# Patient Record
Sex: FEMALE | Race: WHITE | NOT HISPANIC OR LATINO | Employment: FULL TIME | URBAN - METROPOLITAN AREA
[De-identification: names, ages, dates, MRNs, and addresses within clinical notes are randomized per-mention and may not be internally consistent; named-entity substitution may affect disease eponyms.]

---

## 2023-03-08 ENCOUNTER — APPOINTMENT (OUTPATIENT)
Dept: PHARMACY | Facility: HOSPITAL | Age: 58
End: 2023-03-08
Payer: COMMERCIAL

## 2023-03-08 DIAGNOSIS — E66.01 MORBID OBESITY WITH BMI OF 45.0-49.9, ADULT (MULTI): Primary | ICD-10-CM

## 2023-03-08 PROBLEM — R79.0 LOW IRON STORES: Status: ACTIVE | Noted: 2023-03-08

## 2023-03-08 PROBLEM — T78.40XA ALLERGIC REACTION: Status: ACTIVE | Noted: 2023-03-08

## 2023-03-08 PROBLEM — I10 BENIGN ESSENTIAL HYPERTENSION: Status: ACTIVE | Noted: 2023-03-08

## 2023-03-08 PROBLEM — E78.5 HYPERLIPIDEMIA: Status: ACTIVE | Noted: 2023-03-08

## 2023-03-08 PROBLEM — D64.9 ANEMIA: Status: ACTIVE | Noted: 2023-03-08

## 2023-03-08 PROBLEM — R00.2 PALPITATION: Status: ACTIVE | Noted: 2023-03-08

## 2023-03-08 PROBLEM — M54.2 NECK PAIN: Status: ACTIVE | Noted: 2023-03-08

## 2023-03-08 PROBLEM — R21 RASH: Status: ACTIVE | Noted: 2023-03-08

## 2023-03-08 PROBLEM — R60.0 LEG EDEMA: Status: ACTIVE | Noted: 2023-03-08

## 2023-03-08 PROBLEM — R10.2 PELVIC PAIN: Status: ACTIVE | Noted: 2023-03-08

## 2023-03-08 PROBLEM — M25.562 LEFT KNEE PAIN: Status: ACTIVE | Noted: 2023-03-08

## 2023-03-08 PROBLEM — M17.10 ARTHRITIS OF KNEE: Status: ACTIVE | Noted: 2023-03-08

## 2023-03-08 RX ORDER — SEMAGLUTIDE 0.5 MG/.5ML
0.5 INJECTION, SOLUTION SUBCUTANEOUS
COMMUNITY
Start: 2023-02-17 | End: 2023-03-15 | Stop reason: SDUPTHER

## 2023-03-08 RX ORDER — FERROUS GLUCONATE 324(38)MG
38 TABLET ORAL 2 TIMES DAILY
COMMUNITY
Start: 2019-02-24

## 2023-03-08 RX ORDER — SIMVASTATIN 40 MG/1
40 TABLET, FILM COATED ORAL DAILY
COMMUNITY
Start: 2019-02-24 | End: 2023-07-14 | Stop reason: SDUPTHER

## 2023-03-08 RX ORDER — LISINOPRIL 20 MG/1
20 TABLET ORAL DAILY
COMMUNITY
Start: 2020-07-24 | End: 2023-07-14 | Stop reason: SDUPTHER

## 2023-03-08 RX ORDER — METOPROLOL SUCCINATE 50 MG/1
50 TABLET, EXTENDED RELEASE ORAL DAILY
COMMUNITY
Start: 2020-07-24 | End: 2023-07-14 | Stop reason: SDUPTHER

## 2023-03-08 NOTE — PROGRESS NOTES
[facility]  Mirtha Faria was referred to the Clinical Pharmacy Team for Weight loss management and the addition of Wegovy     Referring Provider: Yolie Serra MD  [address]  [facility], [address]  [place] [address]   _______________________________________________________________________  MEDICATION INITIATION ASSESSMENT    ALLERGIES:     - PMH of Pancreatitis: {YES,NO:42430}  - PMH of Retinopathy: {YES,NO:13792}  - PMH of Urinary Tract Infections: {YES,NO:51532}  Home Pharmacy Reviewed? yes, [facility] [address]  - Last PCP Visit: 12/21/22  - PMH of Pancreatitis: no  - PMH of Retinopathy: no    Diet:   Trying to incorporate more protein and veggies   Been eating breakfast   - 3 meals per day, occasional snack -cereal before bed  - Breakfast: protein pancake, turkey mattson,eggs banana   - Lunch: Leftovers from dinner the day before  - Dinner: protein + veggies, taco soup with ground turkey,   - Snacks: prunes, Special K cereal before bed on occasion, doesn’t tend to eat many snacks   - Drinks: Drinks coffee in office 2xs/ week with splenda and creamer, orange juice, almond milk, diet Pepsi , occasionally, trying to drink more water    Weight: 12/21/22 was 342lb     Exercise Routine:   -Arthritis in knee, goes to physical therapy  -Pool in summer  -Wants to walk more, recently joined [employer]  -[employer] job, project   Adverse Effects: denies any side effects     CURRENT PHARMACOTHERAPY  - Wegovy 0.5mg once weekly under the skin   RELEVANT LAB RESULTS    No results found for: HGBA1C  Lab Results   Component Value Date    CREATININE 0.94 12/29/2022        _______________________________________________________________________  PATIENT EDUCATION/DISCUSSION:  - Counseled patient on MOA, expectations, side effects, duration of therapy, contraindications, administration, and monitoring parameters  - Answered all patient questions and  concerns  _______________________________________________________________________  PLAN  1. START [Insert med and dosing strategy]  2. Prescription sent to [facility] pharmacy for assistance on authorization and copay. Medication will be mailed to patient.    [facility] Provider Follow-Up: [Date]  Clinical Pharmacist follow-up: [Date]  PCP Follow-Up: [Date]    Sloane Mckeon, Mary Beth    Verbal consent to manage patient's drug therapy was obtained from the patient. They were informed they may decline to participate or withdraw from participation in pharmacy services at any time.

## 2023-03-15 ENCOUNTER — TELEMEDICINE (OUTPATIENT)
Dept: PHARMACY | Facility: HOSPITAL | Age: 58
End: 2023-03-15
Payer: COMMERCIAL

## 2023-03-15 DIAGNOSIS — E66.01 MORBID OBESITY WITH BMI OF 45.0-49.9, ADULT (MULTI): ICD-10-CM

## 2023-03-15 RX ORDER — SEMAGLUTIDE 1 MG/.5ML
1 INJECTION, SOLUTION SUBCUTANEOUS
Qty: 2 ML | Refills: 0 | Status: SHIPPED | OUTPATIENT
Start: 2023-03-15 | End: 2023-04-12 | Stop reason: ALTCHOICE

## 2023-03-15 NOTE — PROGRESS NOTES
Pharmacist Clinic: Weight Loss   Follow up  3/15/23     Mirtha Faria (Debbie) was referred to the Clinical Pharmacy Team for weight loss management.    Referring Provider: Dr. Yolie Serra    HISTORY OF PRESENT ILLNESS  Mirtha Faria (Debbie) is a 57 year old with a past medical history of class 3 obesity evidenced by BMI of 48.38kg/m2. She is currently on wegovy 0.5mg once weekly for weight loss management. Referred to pharmacy clinic for medication optimization by Dr. Serra.  _______________________________________________________________________  MEDICATION INITIATION ASSESSMENT    ALLERGIES: Reviewed   Pharmacy: Reviewed     - PMH of Pancreatitis: No    - 3 meals per day, occasional snack -cereal before bed  - Breakfast: protein pancake, turkey mattson,eggs banana   - Lunch: Leftovers from dinner the day before  - Dinner: protein + veggies, taco soup with ground turkey,   - Snacks: prunes, Special K cereal before bed on occasion, doesn’t tend to eat many snacks   - Drinks: Drinks coffee in office 2xs/ week with splenda and creamer, orange juice, almond milk, diet Pepsi , occasionally, trying to drink more water    Weight: 12/21/22 was 342lb   Exercise:   - Does physical therapy 2 days a week   - Started going to the gym 3 other days of the week       CURRENT PHARMACOTHERAPY  - Wegovy 0.5mg under the skin once weekly     Adverse Effects: mentioned some nausea but said it is very rare and not bothersome.     No issues reported in regards to accessibility, affordability, adherence, adverse effects, or organization    RELEVANT LAB RESULTS  No results found for: HGBA1C  Lab Results   Component Value Date    CREATININE 0.94 12/29/2022     Lab Results   Component Value Date    ALT 19 12/29/2022    AST 15 12/29/2022    ALKPHOS 78 12/29/2022    BILITOT 0.5 12/29/2022       DRUG INTERACTIONS  - No significant drug-drug interactions exist that require adjustment to  therapy.  _______________________________________________________________________  PATIENT EDUCATION/DISCUSSION:  - Discussed continuing the good work on diet and exercise   - discussed trying to get a scale to weigh herself every once in a while to see the effects of wegovy   - Answered all patient questions and concerns  _______________________________________________________________________  PLAN  Mirtha (Sri) Giana is a 57 year old with a past medical history of class 3 obesity evidenced by BMI of 48.38kg/m2. She is currently on wegovy 0.5mg once weekly for weight loss management.  We will increase to 1mg once weekly because she is tolerating the Wegovy with no major side effects.   START:   Wegovy  1 mg under the skin once weekly on sundays   1. Medications are dosed appropriate for her renal and hepatic function   Clinical Pharmacist follow-up: [Date]      Sloane Mckeon, PharmD  [facility] PGY-1 Pharmacy Resident     Verbal consent to manage patient's drug therapy was obtained from the patient. They were informed they may decline to participate or withdraw from participation in pharmacy services at any time.

## 2023-03-15 NOTE — PROGRESS NOTES
I reviewed the resident’s note.  I agree with the resident’s findings and plan.     Raj Castro, PharmD

## 2023-04-11 PROBLEM — R63.4 WEIGHT LOSS: Status: ACTIVE | Noted: 2023-04-11

## 2023-04-11 NOTE — PROGRESS NOTES
Patient is sent at the request of Yolie Serra MD for my opinion regarding weight loss.  My final recommendations will be communicated back to the requesting provider by way of shared medical record.     Chapis Faria (Debbie) is a 57 year old with a past medical history of class 3 obesity evidenced by BMI of 48.38kg/m2. At last PharmD. Visit, Sloane Mckeon increased patient's Wegovy dose to 1mg once weekly.     The patient does have a known family history of diabetes.    Allergies   Allergen Reactions    Hydrochlorothiazide Rash     Diet:  - 3 meals per day, occasional snack -cereal before bed  -Patient is focusing on eating more protein and drinking more water  - Breakfast: protein pancake, turkey mattson, OR eggs, sometimes banana or fruit  - Lunch: Leftovers from dinner the day before  - Dinner: protein + veggies, taco soup with ground turkey  - Snacks: prunes, Special K cereal before bed on occasion, doesn’t tend to eat many snacks   - Drinks: Drinks coffee in office 2xs/ week with splenda and creamer, orange juice, almond milk, diet Pepsi , occasionally, trying to drink more water   -Denies referral to dietician at this time.     Exercise:  Weight: 12/21/22 was 342lb   4/12/23 ~337lb last she remembers    Exercise:   -Does physical therapy 2 days a week   -Started going to the gym 3 other days of the week   -Taking her dog for a walk    Sleep:  -Averages 7-9 hours of sleep per night  -Was less when her mother passed away    Stress Level:  -Rates 9/10 on recent stress in daily life  -Mom recently passed away    Objective     There were no vitals taken for this visit.    Current Pharmacotherapy:    -Wegovy 1mg: inject 1mg once weekly on Sundays    Drug-drug interactions:  No significant drug interactions that require medication adjustment at this time.     Lab Review  Lab Results   Component Value Date    BILITOT 0.5 12/29/2022    CALCIUM 9.4 12/29/2022    CO2 26 12/29/2022     12/29/2022     CREATININE 0.94 12/29/2022    GLUCOSE 88 12/29/2022    ALKPHOS 78 12/29/2022    K 4.6 12/29/2022    PROT 6.9 12/29/2022     12/29/2022    AST 15 12/29/2022    ALT 19 12/29/2022    BUN 20 12/29/2022    ANIONGAP 14 12/29/2022    ALBUMIN 4.2 12/29/2022    GFRF 71 12/29/2022     Lab Results   Component Value Date    TRIG 182 (H) 12/29/2022    CHOL 228 (H) 12/29/2022    HDL 54.5 12/29/2022     LDL 0 - 99 mg/dL 137 High  112 High      - PMH of Pancreatitis: No     Assessment/Plan   Problem List Items Addressed This Visit          Endocrine/Metabolic    Morbid obesity with BMI of 45.0-49.9, adult (CMS/[facility]) - Primary    Relevant Medications    semaglutide, weight loss, (Wegovy) 1.7 mg/0.75 mL pen injector    Other Relevant Orders    Follow Up In Advanced Primary Care - Pharmacy     RX changes:   INCREASE:  -Wegovy 1.7mg: inject 1.7mg once weekly on Sundays  Patient is tolerating Wegovy 1mg dose extremely well with side effects, no nausea or GI issues. Counseled on increased dose in adjunct to lifestyle changes. Patient will weigh herself once weekly and continue working on lifestyle changes such as walking the dog outside and eating more protein, fruits and vegetables. Sent Wegov[facility]mg [facility] to [facility] in [place].    Patient Education/ Goals:  Weight management, chronic (Wegovy):  -Instructed the patient that Wegovy must be refrigerated If necessary, the pen may be kept at room temperature for up to 28 days.   -Check the pen label to make sure that it is the correct medication and dose. Also check to make sure that the solution is not cloudy, discolored or have particles in it.   -Ensure to change (rotate) injection sites each week. You can use the same area of the body but inject in a different part of that area.   -Injections should be done on the same day each week, if you forget you have up to 5 days to  take your dose.   - Advised patient that they may experience improved satiety after meals and portion  sizes of meals may be reduced as doses of Trulicity increase.  -Week 13 through week 16: 1.7 mg once weekly  -Week 17 and thereafter (maintenance dosage): 2.4 mg once weekly; if not tolerated, may temporarily decrease dosage to 1.7 mg once weekly for up to 4 additional weeks, then increase to 2.4 mg once weekly.    Compliance at present is estimated to be excellent. Efforts to improve compliance (if necessary) will be directed at dietary modifications: more protein and fruits, vegetables, limiting snacking and increased exercise.    Follow up: I recommend diabetes care be  3 weeks, 8:30am May 3rd, 2023    Future recommendations:   -Continue to work on lifestyle interventions such as exercising more, at least 30 minutes per day, 5 days of the week. Continue trying to implement healthy lifestyle changes such as eating less processed food. Focus on eating more fruits and vegetables, incorporate whole foods into daily diet.     Apolonia Gamez, PharmD    Meds PGY1 Resident    Verbal consent to manage patient's drug therapy was obtained from the patient. They were informed they may decline to participate or withdraw from participation in pharmacy services at any time.

## 2023-04-12 ENCOUNTER — TELEMEDICINE (OUTPATIENT)
Dept: PHARMACY | Facility: HOSPITAL | Age: 58
End: 2023-04-12
Payer: COMMERCIAL

## 2023-04-12 DIAGNOSIS — E66.01 MORBID OBESITY WITH BMI OF 45.0-49.9, ADULT (MULTI): Primary | ICD-10-CM

## 2023-04-12 RX ORDER — SEMAGLUTIDE 1.7 MG/.75ML
1.7 INJECTION, SOLUTION SUBCUTANEOUS
Qty: 3 ML | Refills: 1 | Status: SHIPPED | OUTPATIENT
Start: 2023-04-12 | End: 2023-05-03 | Stop reason: ALTCHOICE

## 2023-04-12 NOTE — PROGRESS NOTES
I reviewed the progress note and agree with the resident’s findings and plans as written. Case discussed with resident.    Raj Castro, CarlosD

## 2023-04-12 NOTE — PATIENT INSTRUCTIONS
"Keep up the great work with lifestyle changes! Let walking and exercise be your stress relief and replace \"bored/ stressful\" eating with healthier habits. Continue eating fruits, vegetables, centering meals around protein, nutrient dense foods. I sent Wegovy 1.7 mg dose to [facility] [place].    Thanks! Have a great week.    Apolonia Gamez, PharmD    Meds PGY1 Resident  "

## 2023-04-12 NOTE — ASSESSMENT & PLAN NOTE
Patient is tolerating Wegovy 1mg dose extremely well with side effects, no nausea or GI issues. Counseled on increased dose in adjunct to lifestyle changes. Patient will weigh herself once weekly and continue working on lifestyle changes such as walking the dog outside and eating more protein, fruits and vegetables. Sent Wegovy 1.7mg prescription to [facility] in [place].

## 2023-05-01 NOTE — PROGRESS NOTES
"Patient is sent at the request of Yolie Serra MD for my opinion regarding weight loss.  My final recommendations will be communicated back to the requesting provider by way of shared medical record.     Chapis Faria (Debbie) is a 57 year old with a past medical history of class 3 obesity evidenced by BMI of 48.38kg/m2. At last PharmD. Visit, increased patient's Wegovy dose to 1.7mg once weekly.     The patient does have a known family history of diabetes.    Allergies   Allergen Reactions    Hydrochlorothiazide Rash     Diet: \"no major changes per diet\" patient is really focusing on eating nutrient-dense foods  - ~3 smaller meals per day, occasional snack   -Patient is focusing on eating more protein and drinking more water  - Breakfast: protein pancake, turkey mattson, OR eggs, sometimes banana or fruit  - Lunch: Leftovers from dinner the day before  - Dinner: protein + veggies, taco soup with ground turkey  - Snacks: prunes, Special K cereal   - Drinks: Drinks coffee in office 2xs/ week with splenda and creamer, orange juice, almond milk, diet Pepsi occasionally,  drinking more water   -Denies referral to dietician at this time.     Exercise:  Weight: 12/21/22 was 342lb   4/12/23 ~337lb last she remembers  5/1/23: Has not been weighing but notices that her clothes are bigger, feels the weight loss    Exercise:   -Does physical therapy 2 days a week   -Started going to the gym 3 other days of the week   -Taking her dog for a walk  -Goes to the gym with her son, others are motivating her    Sleep:  -Averages 7-9 hours of sleep per night  -Was less when her mother passed away    Stress Level:  -Rates 5-6/10 on recent stress in daily life  -Mom recently passed away (Was 9/10 previously)    Objective     There were no vitals taken for this visit.    Current Pharmacotherapy:    -Wegovy 1.7mg: inject 1.7mg once weekly on Sundays    Drug-drug interactions:  No significant drug interactions that require " medication adjustment at this time.     Lab Review  Lab Results   Component Value Date    BILITOT 0.5 12/29/2022    CALCIUM 9.4 12/29/2022    CO2 26 12/29/2022     12/29/2022    CREATININE 0.94 12/29/2022    GLUCOSE 88 12/29/2022    ALKPHOS 78 12/29/2022    K 4.6 12/29/2022    PROT 6.9 12/29/2022     12/29/2022    AST 15 12/29/2022    ALT 19 12/29/2022    BUN 20 12/29/2022    ANIONGAP 14 12/29/2022    ALBUMIN 4.2 12/29/2022    GFRF 71 12/29/2022     Lab Results   Component Value Date    TRIG 182 (H) 12/29/2022    CHOL 228 (H) 12/29/2022    HDL 54.5 12/29/2022     LDL 0 - 99 mg/dL 137 High  112 High      - PMH of Pancreatitis: No   - PMH of Retinopathy: No   - PMH of Thyroid C-Cell Tumors: No    Assessment/Plan   Problem List Items Addressed This Visit          Endocrine/Metabolic    Morbid obesity with BMI of 45.0-49.9, adult (CMS/[facility]) - Primary     RX changes:   INCREASE:  -Wegovy 2.4 mg: inject 2.4 mg once weekly on Sundays  Patient is tolerating Wegovy 1.7mg dose extremely well with no major side effects, reports minor bloating after dose increase and feels much anders after meals. No GI issues. Counseled patient on increased dose in adjunct to lifestyle changes. Patient will weigh herself once weekly and continue working on lifestyle changes such as walking the dog outside and eating more protein and vegetables. Sent Wegovy 2.4 mg prescription to [facility] in [place].    Patient Education/ Goals:  Weight management, chronic (Wegovy):  - Advised patient that they may experience improved satiety after meals and portion sizes of meals may be reduced as doses of Trulicity increase.  -Week 17 and thereafter (maintenance dosage): 2.4 mg once weekly; if not tolerated, may temporarily decrease dosage to 1.7 mg once weekly for up to 4 additional weeks, then increase to 2.4 mg once weekly.    Compliance at present is estimated to be excellent. Efforts to improve compliance (if necessary) will be directed at  dietary modifications: more protein and vegetables, limiting snacking and increased exercise.    Follow up: I recommend diabetes care be:  4 weeks at 8:30AM, 5/31/23    Future recommendations:   -Continue to work on lifestyle interventions such as exercising more, at least 30 minutes per day, 5 days of the week. Continue trying to implement healthy lifestyle changes such as eating less processed food. Focus on eating more fruits and vegetables, incorporate whole foods into daily diet.   -Recommended updated lipid panel and A1c    Apolonia Gamez, PharmD    Meds PGY1 Resident    Verbal consent to manage patient's drug therapy was obtained from the patient. They were informed they may decline to participate or withdraw from participation in pharmacy services at any time.

## 2023-05-03 ENCOUNTER — TELEMEDICINE (OUTPATIENT)
Dept: PHARMACY | Facility: HOSPITAL | Age: 58
End: 2023-05-03
Payer: COMMERCIAL

## 2023-05-03 ENCOUNTER — TELEPHONE (OUTPATIENT)
Dept: PRIMARY CARE | Facility: CLINIC | Age: 58
End: 2023-05-03

## 2023-05-03 ENCOUNTER — TELEPHONE (OUTPATIENT)
Dept: PHARMACY | Facility: HOSPITAL | Age: 58
End: 2023-05-03

## 2023-05-03 DIAGNOSIS — E66.01 MORBID OBESITY WITH BMI OF 45.0-49.9, ADULT (MULTI): Primary | ICD-10-CM

## 2023-05-03 RX ORDER — SEMAGLUTIDE 2.4 MG/.75ML
2.4 INJECTION, SOLUTION SUBCUTANEOUS
Qty: 3 ML | Refills: 3 | Status: SHIPPED | OUTPATIENT
Start: 2023-05-03 | End: 2023-07-26 | Stop reason: ALTCHOICE

## 2023-05-03 NOTE — Clinical Note
Plan to increase to Wegovy 2.7mg dose for maximum weight loss potential, patient is tolerating well. Sent [facility] [place] [place].

## 2023-05-03 NOTE — TELEPHONE ENCOUNTER
David Barksdale,   I rec'd a PA request for patients Wegovy, can you do this one please, thank you  Jessie

## 2023-05-26 DIAGNOSIS — R79.0 LOW IRON STORES: ICD-10-CM

## 2023-05-26 DIAGNOSIS — I10 BENIGN ESSENTIAL HYPERTENSION: ICD-10-CM

## 2023-05-26 DIAGNOSIS — D64.9 ANEMIA, UNSPECIFIED TYPE: ICD-10-CM

## 2023-05-26 DIAGNOSIS — E78.5 HYPERLIPIDEMIA, UNSPECIFIED HYPERLIPIDEMIA TYPE: ICD-10-CM

## 2023-05-31 ENCOUNTER — APPOINTMENT (OUTPATIENT)
Dept: PHARMACY | Facility: HOSPITAL | Age: 58
End: 2023-05-31
Payer: COMMERCIAL

## 2023-06-01 ENCOUNTER — TELEMEDICINE (OUTPATIENT)
Dept: PHARMACY | Facility: HOSPITAL | Age: 58
End: 2023-06-01
Payer: COMMERCIAL

## 2023-06-01 DIAGNOSIS — E78.2 MIXED HYPERLIPIDEMIA: Primary | ICD-10-CM

## 2023-06-01 DIAGNOSIS — E66.01 MORBID OBESITY WITH BMI OF 45.0-49.9, ADULT (MULTI): ICD-10-CM

## 2023-06-01 NOTE — PROGRESS NOTES
Patient is sent at the request of Yolie Serra MD for my opinion regarding weight loss.  My final recommendations will be communicated back to the requesting provider by way of shared medical record.     Chapis Faria (Debbie) is a 57 year old with a past medical history of class 3 obesity evidenced by BMI of 48.38kg/m2. At last PharmD. Visit, increased patient's Wegovy dose to 2.4mg once weekly.     The patient does have a known family history of diabetes.    Allergies   Allergen Reactions    Hydrochlorothiazide Rash     Diet:   Eating much less   Added more protein to her diet   Has protein shakes with no sugar sometimes   ~3 smaller meals per day, occasional snack   -Patient is focusing on eating more protein and drinking more water  - Breakfast: protein pancake, turkey mattson, OR eggs, sometimes banana or fruit  - Lunch: Leftovers from dinner the day before  - Dinner: protein + veggies, taco soup with ground turkey  - Snacks: prunes, Special K cereal   - Drinks: Drinks coffee in office 2xs/ week with splenda and creamer, orange juice rarely ,sugar free iced tea, diet Pepsi occasionally,  drinking more water      Weight:   12/21/22 was 342lb   4/12/23 ~337lb last she remembers  5/1/23: Has not been weighing but notices that her clothes are bigger, feels the weight loss  6/1/23- notices clothes are fitting better, 332 lbs     Exercise:   -Started going to the gym 2-4  days of the week  at the gym   -Taking her dog for a walk sometimes   -Goes to the gym with her son, others are motivating her      Objective     There were no vitals taken for this visit.    Current Pharmacotherapy:    -Wegovy 2.4mg: inject 2.4 mg once weekly on Sundays    Side Effects: None     Drug-drug interactions:  No significant drug interactions that require medication adjustment at this time.     Lab Review  Lab Results   Component Value Date    BILITOT 0.5 12/29/2022    CALCIUM 9.4 12/29/2022    CO2 26 12/29/2022      12/29/2022    CREATININE 0.94 12/29/2022    GLUCOSE 88 12/29/2022    ALKPHOS 78 12/29/2022    K 4.6 12/29/2022    PROT 6.9 12/29/2022     12/29/2022    AST 15 12/29/2022    ALT 19 12/29/2022    BUN 20 12/29/2022    ANIONGAP 14 12/29/2022    ALBUMIN 4.2 12/29/2022    GFRF 71 12/29/2022     Lab Results   Component Value Date    TRIG 182 (H) 12/29/2022    CHOL 228 (H) 12/29/2022    HDL 54.5 12/29/2022     LDL 0 - 99 mg/dL 137 High  112 High      - PMH of Pancreatitis: No   - PMH of Retinopathy: No   - PMH of Thyroid C-Cell Tumors: No    Assessment/Plan   Problem List Items Addressed This Visit          Endocrine/Metabolic    Morbid obesity with BMI of 45.0-49.9, adult (CMS/[facility])    Relevant Orders    Follow Up In Advanced Primary Care - Pharmacy     RX changes:    Continue:   -Wegovy 2.4 mg: inject 2.4 mg once weekly on Sundays      All medications are dosed appropriate for renal and hepatic function   Patient Education/ Goals:  Weight management, chronic (Wegovy):  - Advised patient that they may experience improved satiety after meals and portion sizes of meals may be reduced as doses of Trulicity increase.  - discussed that she may not be losing as much weight on the scale because muscles weighs more than fat.   - discussed that she may see some changes in her weight especially if she is weighing herself at different times a day.   -Week 17 and thereafter (maintenance dosage): 2.4 mg once weekly    Compliance at present is estimated to be excellent. Efforts to improve compliance (if necessary) will be directed at increased exercise.   - discussed the goal of 30 minutes of physical activity 5 days a week. She is currently doing 3-4 so trying to add a few more days can be very beneficial for her.     Follow up: I recommend diabetes care be:  4 weeks at 6/28/23 at 8:30am     Future recommendations:   -Recommended updated lipid panel and A1c    Continue all meds under the continuation of care with the referring  provider and clinical pharmacy team.    Sloane Mckeon PharmD   Meds PGY1 Pharmacy Resident     Verbal consent to manage patient’s drug therapy was obtained from the patient. They were informed they may decline to participate or withdraw from participation in pharmacy services at any time.

## 2023-06-02 ENCOUNTER — APPOINTMENT (OUTPATIENT)
Dept: PRIMARY CARE | Facility: CLINIC | Age: 58
End: 2023-06-02
Payer: COMMERCIAL

## 2023-06-27 NOTE — PROGRESS NOTES
Patient is sent at the request of Yolie Serra MD for my opinion regarding weight loss.  My final recommendations will be communicated back to the requesting provider by way of shared medical record.     Chapis Faria (Debbie) is a 57 year old with a past medical history of class 3 obesity evidenced by BMI of 48.38kg/m2.  She is currently on Wegovy 2.4 mg once a week.    The patient does have a known family history of diabetes.    Allergies   Allergen Reactions    Hydrochlorothiazide Rash     Diet:   Eating much less   Added more protein to her diet   Has protein shakes with no sugar sometimes   3 smaller meals per day, occasional snack   Patient is focusing on eating more protein and drinking more water   Breakfast: protein pancake, turkey mattson, OR eggs, sometimes banana or fruit   Lunch: Leftovers from dinner the day before   Dinner: protein + veggies, taco soup with ground turkey   Snacks: prunes, Special K cereal    Drinks: Drinks coffee in office 2xs/ week with splenda and creamer, orange juice rarely ,sugar free iced tea, diet Pepsi occasionally,  drinking more water    Weight:   12/21/22 was 342lb   4/12/23 ~337lb last she remembers  5/1/23: Has not been weighing but notices that her clothes are bigger, feels the weight loss  6/1/23- notices clothes are fitting better, 332 lbs   6/28/23- feels like things are fitting better.  She forgot to weight herself today.     Exercise:   Started going to the gym 2-4  days of the week with her son.   Taking her dog for a walk sometimes       Objective     There were no vitals taken for this visit.    Current Pharmacotherapy:    Wegovy 2.4mg: inject 2.4mg once weekly on Sundays    Side Effects:   Some nausea occasionally but not bothersome     Lab Review  Lab Results   Component Value Date    BILITOT 0.5 12/29/2022    CALCIUM 9.4 12/29/2022    CO2 26 12/29/2022     12/29/2022    CREATININE 0.94 12/29/2022    GLUCOSE 88 12/29/2022    ALKPHOS 78  12/29/2022    K 4.6 12/29/2022    PROT 6.9 12/29/2022     12/29/2022    AST 15 12/29/2022    ALT 19 12/29/2022    BUN 20 12/29/2022    ANIONGAP 14 12/29/2022    ALBUMIN 4.2 12/29/2022    GFRF 71 12/29/2022     Lab Results   Component Value Date    TRIG 182 (H) 12/29/2022    CHOL 228 (H) 12/29/2022    HDL 54.5 12/29/2022     LDL 0 - 99 mg/dL 137 High  112 High    Pertinent PMH Review:   PMH of Pancreatitis: No   PMH of Retinopathy: No   PMH of Thyroid C-Cell Tumors: No    Drug-drug interactions:  No significant drug interactions that require medication adjustment at this time.     Assessment/Plan   Problem List Items Addressed This Visit       Morbid obesity with BMI of 45.0-49.9, adult (CMS/[facility])   Mirtha Faria (Debbie) is a 57 year old with a past medical history of class 3 obesity evidenced by BMI of 48.38kg/m2.  She is currently on Wegovy 2.4 mg once a week. She has been feeling like she has continued to lose weight while on the Wegovy.     RX changes:   a.  Continue:   i. Wegovy 2.4 mg: inject 2.4 mg once weekly on Sundays  All medications are dosed appropriate for renal and hepatic function   Compliance at present is estimated to be excellent. Efforts to improve compliance (if necessary) will be directed at increased exercise.  Follow up: I recommend  weight loss follow up be: July 26th at 8:30am   Future recommendations:   a. Recommended updated lipid panel and A1c    Continue all meds under the continuation of care with the referring provider and clinical pharmacy team.    Sloane Mckeon, Mary Beth   Meds PGY1 Pharmacy Resident     Verbal consent to manage patient’s drug therapy was obtained from the patient. They were informed they may decline to participate or withdraw from participation in pharmacy services at any time.

## 2023-06-28 ENCOUNTER — TELEMEDICINE (OUTPATIENT)
Dept: PHARMACY | Facility: HOSPITAL | Age: 58
End: 2023-06-28
Payer: COMMERCIAL

## 2023-06-28 DIAGNOSIS — E66.01 MORBID OBESITY WITH BMI OF 45.0-49.9, ADULT (MULTI): ICD-10-CM

## 2023-06-28 NOTE — PATIENT INSTRUCTIONS
Mirtha it was great speaking with you today. Keep up the good work on your diet and try to increase your activity like we talked about to try to do 30 minutes of exercise 5 days a week. When you need a refill let your pharmacy know that one of the doses had issues when you went to inject and they should be able to get it filled sooner for you. As we discussed

## 2023-07-10 ENCOUNTER — LAB (OUTPATIENT)
Dept: LAB | Facility: LAB | Age: 58
End: 2023-07-10
Payer: COMMERCIAL

## 2023-07-10 DIAGNOSIS — D64.9 ANEMIA, UNSPECIFIED TYPE: ICD-10-CM

## 2023-07-10 DIAGNOSIS — R79.0 LOW IRON STORES: ICD-10-CM

## 2023-07-10 DIAGNOSIS — E78.2 MIXED HYPERLIPIDEMIA: ICD-10-CM

## 2023-07-10 DIAGNOSIS — I10 BENIGN ESSENTIAL HYPERTENSION: ICD-10-CM

## 2023-07-10 DIAGNOSIS — E78.5 HYPERLIPIDEMIA, UNSPECIFIED HYPERLIPIDEMIA TYPE: ICD-10-CM

## 2023-07-10 LAB
ALANINE AMINOTRANSFERASE (SGPT) (U/L) IN SER/PLAS: 15 U/L (ref 7–45)
ALBUMIN (G/DL) IN SER/PLAS: 4 G/DL (ref 3.4–5)
ALKALINE PHOSPHATASE (U/L) IN SER/PLAS: 73 U/L (ref 33–110)
ANION GAP IN SER/PLAS: 14 MMOL/L (ref 10–20)
ASPARTATE AMINOTRANSFERASE (SGOT) (U/L) IN SER/PLAS: 10 U/L (ref 9–39)
BASOPHILS (10*3/UL) IN BLOOD BY AUTOMATED COUNT: 0.03 X10E9/L (ref 0–0.1)
BASOPHILS/100 LEUKOCYTES IN BLOOD BY AUTOMATED COUNT: 0.5 % (ref 0–2)
BILIRUBIN TOTAL (MG/DL) IN SER/PLAS: 0.5 MG/DL (ref 0–1.2)
CALCIUM (MG/DL) IN SER/PLAS: 9.6 MG/DL (ref 8.6–10.6)
CARBON DIOXIDE, TOTAL (MMOL/L) IN SER/PLAS: 27 MMOL/L (ref 21–32)
CHLORIDE (MMOL/L) IN SER/PLAS: 106 MMOL/L (ref 98–107)
CHOLESTEROL (MG/DL) IN SER/PLAS: 156 MG/DL (ref 0–199)
CHOLESTEROL IN HDL (MG/DL) IN SER/PLAS: 49.1 MG/DL
CHOLESTEROL/HDL RATIO: 3.2
CREATININE (MG/DL) IN SER/PLAS: 0.88 MG/DL (ref 0.5–1.05)
EOSINOPHILS (10*3/UL) IN BLOOD BY AUTOMATED COUNT: 0.24 X10E9/L (ref 0–0.7)
EOSINOPHILS/100 LEUKOCYTES IN BLOOD BY AUTOMATED COUNT: 3.7 % (ref 0–6)
ERYTHROCYTE DISTRIBUTION WIDTH (RATIO) BY AUTOMATED COUNT: 16.1 % (ref 11.5–14.5)
ERYTHROCYTE MEAN CORPUSCULAR HEMOGLOBIN CONCENTRATION (G/DL) BY AUTOMATED: 31.3 G/DL (ref 32–36)
ERYTHROCYTE MEAN CORPUSCULAR VOLUME (FL) BY AUTOMATED COUNT: 90 FL (ref 80–100)
ERYTHROCYTES (10*6/UL) IN BLOOD BY AUTOMATED COUNT: 4.47 X10E12/L (ref 4–5.2)
ESTIMATED AVERAGE GLUCOSE FOR HBA1C: 111 MG/DL
GFR FEMALE: 77 ML/MIN/1.73M2
GLUCOSE (MG/DL) IN SER/PLAS: 92 MG/DL (ref 74–99)
HEMATOCRIT (%) IN BLOOD BY AUTOMATED COUNT: 40.3 % (ref 36–46)
HEMOGLOBIN (G/DL) IN BLOOD: 12.6 G/DL (ref 12–16)
HEMOGLOBIN A1C/HEMOGLOBIN TOTAL IN BLOOD: 5.5 %
IMMATURE GRANULOCYTES/100 LEUKOCYTES IN BLOOD BY AUTOMATED COUNT: 0.5 % (ref 0–0.9)
LDL: 78 MG/DL (ref 0–99)
LEUKOCYTES (10*3/UL) IN BLOOD BY AUTOMATED COUNT: 6.5 X10E9/L (ref 4.4–11.3)
LYMPHOCYTES (10*3/UL) IN BLOOD BY AUTOMATED COUNT: 1.38 X10E9/L (ref 1.2–4.8)
LYMPHOCYTES/100 LEUKOCYTES IN BLOOD BY AUTOMATED COUNT: 21.3 % (ref 13–44)
MONOCYTES (10*3/UL) IN BLOOD BY AUTOMATED COUNT: 0.58 X10E9/L (ref 0.1–1)
MONOCYTES/100 LEUKOCYTES IN BLOOD BY AUTOMATED COUNT: 9 % (ref 2–10)
NEUTROPHILS (10*3/UL) IN BLOOD BY AUTOMATED COUNT: 4.22 X10E9/L (ref 1.2–7.7)
NEUTROPHILS/100 LEUKOCYTES IN BLOOD BY AUTOMATED COUNT: 65 % (ref 40–80)
NRBC (PER 100 WBCS) BY AUTOMATED COUNT: 0 /100 WBC (ref 0–0)
PLATELETS (10*3/UL) IN BLOOD AUTOMATED COUNT: 337 X10E9/L (ref 150–450)
POTASSIUM (MMOL/L) IN SER/PLAS: 4.5 MMOL/L (ref 3.5–5.3)
PROTEIN TOTAL: 6.7 G/DL (ref 6.4–8.2)
SODIUM (MMOL/L) IN SER/PLAS: 142 MMOL/L (ref 136–145)
TRIGLYCERIDE (MG/DL) IN SER/PLAS: 146 MG/DL (ref 0–149)
UREA NITROGEN (MG/DL) IN SER/PLAS: 12 MG/DL (ref 6–23)
VLDL: 29 MG/DL (ref 0–40)

## 2023-07-10 PROCEDURE — 83036 HEMOGLOBIN GLYCOSYLATED A1C: CPT

## 2023-07-10 PROCEDURE — 83550 IRON BINDING TEST: CPT

## 2023-07-10 PROCEDURE — 82728 ASSAY OF FERRITIN: CPT

## 2023-07-10 PROCEDURE — 85025 COMPLETE CBC W/AUTO DIFF WBC: CPT

## 2023-07-10 PROCEDURE — 83540 ASSAY OF IRON: CPT

## 2023-07-10 PROCEDURE — 80053 COMPREHEN METABOLIC PANEL: CPT

## 2023-07-10 PROCEDURE — 80061 LIPID PANEL: CPT

## 2023-07-10 PROCEDURE — 36415 COLL VENOUS BLD VENIPUNCTURE: CPT

## 2023-07-11 LAB
FERRITIN (UG/LL) IN SER/PLAS: 23 UG/L (ref 8–150)
IRON (UG/DL) IN SER/PLAS: 54 UG/DL (ref 35–150)
IRON BINDING CAPACITY (UG/DL) IN SER/PLAS: 374 UG/DL (ref 240–445)
IRON SATURATION (%) IN SER/PLAS: 14 % (ref 25–45)

## 2023-07-14 ENCOUNTER — OFFICE VISIT (OUTPATIENT)
Dept: PRIMARY CARE | Facility: CLINIC | Age: 58
End: 2023-07-14
Payer: COMMERCIAL

## 2023-07-14 VITALS
HEART RATE: 80 BPM | WEIGHT: 293 LBS | SYSTOLIC BLOOD PRESSURE: 118 MMHG | HEIGHT: 71 IN | BODY MASS INDEX: 41.02 KG/M2 | RESPIRATION RATE: 18 BRPM | TEMPERATURE: 99.1 F | DIASTOLIC BLOOD PRESSURE: 70 MMHG

## 2023-07-14 DIAGNOSIS — Z00.00 ROUTINE GENERAL MEDICAL EXAMINATION AT A HEALTH CARE FACILITY: Primary | ICD-10-CM

## 2023-07-14 DIAGNOSIS — E78.5 HYPERLIPIDEMIA, UNSPECIFIED HYPERLIPIDEMIA TYPE: ICD-10-CM

## 2023-07-14 DIAGNOSIS — E66.01 MORBID OBESITY WITH BMI OF 45.0-49.9, ADULT (MULTI): ICD-10-CM

## 2023-07-14 DIAGNOSIS — I10 BENIGN ESSENTIAL HYPERTENSION: ICD-10-CM

## 2023-07-14 PROCEDURE — 99214 OFFICE O/P EST MOD 30 MIN: CPT | Performed by: INTERNAL MEDICINE

## 2023-07-14 PROCEDURE — 3074F SYST BP LT 130 MM HG: CPT | Performed by: INTERNAL MEDICINE

## 2023-07-14 PROCEDURE — 3078F DIAST BP <80 MM HG: CPT | Performed by: INTERNAL MEDICINE

## 2023-07-14 PROCEDURE — 3008F BODY MASS INDEX DOCD: CPT | Performed by: INTERNAL MEDICINE

## 2023-07-14 PROCEDURE — 1036F TOBACCO NON-USER: CPT | Performed by: INTERNAL MEDICINE

## 2023-07-14 RX ORDER — SIMVASTATIN 40 MG/1
40 TABLET, FILM COATED ORAL DAILY
Qty: 90 TABLET | Refills: 1 | Status: SHIPPED | OUTPATIENT
Start: 2023-07-14 | End: 2024-02-27

## 2023-07-14 RX ORDER — SEMAGLUTIDE 2.4 MG/.75ML
2.4 INJECTION, SOLUTION SUBCUTANEOUS
Qty: 3 ML | Refills: 3 | Status: CANCELLED | OUTPATIENT
Start: 2023-07-14

## 2023-07-14 RX ORDER — METOPROLOL SUCCINATE 50 MG/1
50 TABLET, EXTENDED RELEASE ORAL DAILY
Qty: 90 TABLET | Refills: 1 | Status: SHIPPED | OUTPATIENT
Start: 2023-07-14 | End: 2024-02-27

## 2023-07-14 RX ORDER — LISINOPRIL 20 MG/1
20 TABLET ORAL DAILY
Qty: 90 TABLET | Refills: 1 | Status: SHIPPED | OUTPATIENT
Start: 2023-07-14 | End: 2024-02-27

## 2023-07-14 ASSESSMENT — COLUMBIA-SUICIDE SEVERITY RATING SCALE - C-SSRS
6. HAVE YOU EVER DONE ANYTHING, STARTED TO DO ANYTHING, OR PREPARED TO DO ANYTHING TO END YOUR LIFE?: NO
1. IN THE PAST MONTH, HAVE YOU WISHED YOU WERE DEAD OR WISHED YOU COULD GO TO SLEEP AND NOT WAKE UP?: NO
2. HAVE YOU ACTUALLY HAD ANY THOUGHTS OF KILLING YOURSELF?: NO

## 2023-07-14 ASSESSMENT — PATIENT HEALTH QUESTIONNAIRE - PHQ9
2. FEELING DOWN, DEPRESSED OR HOPELESS: SEVERAL DAYS
1. LITTLE INTEREST OR PLEASURE IN DOING THINGS: SEVERAL DAYS
SUM OF ALL RESPONSES TO PHQ9 QUESTIONS 1 AND 2: 2

## 2023-07-14 ASSESSMENT — ENCOUNTER SYMPTOMS: DEPRESSION: 0

## 2023-07-14 NOTE — PROGRESS NOTES
"Subjective   Patient ID: Mirtha Faria is a 58 y.o. female who presents for Follow-up (BW results).  HPI  Patient presents today for follow-up of hypertension hyperlipidemia she is also been on Wegovy for weight loss managed by her pharmacy team.  Patient is also on iron.  She is due for her EGD and colonoscopy this year and her iron has trended down somewhat.  We reviewed her other labs which are looking good she has lost about 13 more pounds to go via she is trying to exercise and she is feeling much better she is able to move around and get up a lot easier to get going so she has been doing things with her son exercising also watching her diet and she is feeling quite well.    She denies chest pains headaches dizziness lightheadedness or shortness of breath she has no lower extremity edema.    She saw cardiology she had a coronary artery calcium score which is 0 she maintains on all her regular medications.  She sees [facility] for her digestive issues she will make an appointment with given the fact that her iron is low and she is due for her scans anyways    Review of Systems  Review of systems was performed and is otherwise negative except as noted in HPI.  Objective   /70   Pulse 80   Temp 37.3 °C (99.1 °F)   Resp 18   Ht 1.791 m (5' 10.5\")   Wt (!) 150 kg (331 lb)   BMI 46.82 kg/m²    Physical Exam  HEENT is normal  Lungs clear bilaterally  Heart is regular rate and rhythm no murmurs  Abdomen is Benign  Lower extremities no edema  Assessment/Plan   Diagnoses and all orders for this visit:  Routine general medical examination at a health care facility  -     CBC and Auto Differential; Future  -     Iron and TIBC; Future  -     Ferritin; Future  -     Hemoglobin A1C; Future  -     Lipid Panel; Future  -     Comprehensive Metabolic Panel; Future  -     TSH with reflex to Free T4 if abnormal; Future  Morbid obesity with BMI of 45.0-49.9, adult (CMS/[facility])  Hyperlipidemia, unspecified hyperlipidemia type  -   "   simvastatin (Zocor) 40 mg tablet; Take 1 tablet (40 mg) by mouth once daily.  Benign essential hypertension  -     lisinopril 20 mg tablet; Take 1 tablet (20 mg) by mouth once daily.  -     metoprolol succinate XL (Toprol-XL) 50 mg 24 hr tablet; Take 1 tablet (50 mg) by mouth once daily.    Call with issues  Refill sent  Blood work is ordered  She will follow-up with me in 6 months  She will continue with pharmacy for the Matthias Serra MD

## 2023-07-26 ENCOUNTER — TELEMEDICINE (OUTPATIENT)
Dept: PHARMACY | Facility: HOSPITAL | Age: 58
End: 2023-07-26
Payer: COMMERCIAL

## 2023-07-26 DIAGNOSIS — E66.01 MORBID OBESITY WITH BMI OF 45.0-49.9, ADULT (MULTI): ICD-10-CM

## 2023-07-26 RX ORDER — SEMAGLUTIDE 2.4 MG/.75ML
2.4 INJECTION, SOLUTION SUBCUTANEOUS
Qty: 0.75 ML | Refills: 11 | Status: SHIPPED | OUTPATIENT
Start: 2023-07-26 | End: 2023-10-25 | Stop reason: SDUPTHER

## 2023-07-26 NOTE — PROGRESS NOTES
Patient is sent at the request of Yolie Serra MD for my opinion regarding weight loss.  My final recommendations will be communicated back to the requesting provider by way of shared medical record.     Subjective     Mirtha Faria is a 58 y.o. year old female patient with class III obesity (BMI 46.82 kg/m2) complicated by dyslipidemia and hypertension referred for weight loss management. She was referred for initiation of a once-weekly GLP1ra and has been titrated to a dose of Wegovy 2.4 mg once weekly.    The patient does have a known family history of diabetes.    Allergies   Allergen Reactions    Hydrochlorothiazide Rash     Diet:   - See previous discussion on diet  - Notes that she is trying to remaining consistent with protein and vegetable intake  - Reduced portion and less frequent eating  - Discussed potential for a dietician visit    Weight:   - 12/21/22 was 342 lb   - 4/12/23 ~337lb last she remembers  - 5/1/23: Has not been weighing but notices that her clothes are bigger, feels the weight loss  - 6/1/23- notices clothes are fitting better, 332 lbs   - 6/28/23- feels like things are fitting better.  She forgot to weight herself today.   - 7/14/23 - 331 lb (Office visit)    Exercise:   - Gym with her son   - Dog walking   - Tries to 30-40 minutes of exercise 7 days per week     Objective     There were no vitals taken for this visit.    Current Pharmacotherapy:    - Wegovy 2.4 mg: inject 2.4mg once weekly on Sundays (0 doses remaining, completed last injection on 7/23/2023)  - Prescription is currently available for pickup at [facility] pharmacy, this was the last refill    Side Effects:   - No noted adverse effects on 7/26/2023    Lab Review  Lab Results   Component Value Date    BILITOT 0.5 07/10/2023    CALCIUM 9.6 07/10/2023    CO2 27 07/10/2023     07/10/2023    CREATININE 0.88 07/10/2023    GLUCOSE 92 07/10/2023    ALKPHOS 73 07/10/2023    K 4.5 07/10/2023    PROT 6.7 07/10/2023      07/10/2023    AST 10 07/10/2023    ALT 15 07/10/2023    BUN 12 07/10/2023    ANIONGAP 14 07/10/2023    ALBUMIN 4.0 07/10/2023    GFRF 77 07/10/2023     Lab Results   Component Value Date    TRIG 146 07/10/2023    CHOL 156 07/10/2023    HDL 49.1 07/10/2023     Component      Latest Ref Rng 7/10/2023   LDL      0 - 99 mg/dL 78      Pertinent PMH Review:  PMH of Pancreatitis: No   PMH of Retinopathy: No   PMH of Thyroid C-Cell Tumors: No    Drug-drug interactions:  No significant drug interactions that require medication adjustment at this time.     Assessment/Plan   Problem List Items Addressed This Visit       Morbid obesity with BMI of 45.0-49.9, adult (CMS/[facility])    Relevant Medications    semaglutide, weight loss, (Wegovy) 2.4 mg/0.75 mL pen injector    Other Relevant Orders    Follow Up In [facility] - Pharmacy     RX changes:   Continue: Wegovy 2.4 mg once weekly on Sundays  1 year supply of Wegovy 2.4 mg sent to [facility]  All medications are dosed appropriate for renal and hepatic function   Compliance at present is estimated to be excellent. Efforts to improve compliance (if necessary) will be directed at increased exercise.  Follow up: I recommend  weight loss follow up be: 10/25/2023 at 8:30 am    Raj Castro, PharmD  [phone]    Continue all meds under the continuation of care with the referring provider and clinical pharmacy team.

## 2023-08-28 ENCOUNTER — TELEPHONE (OUTPATIENT)
Dept: PHARMACY | Facility: HOSPITAL | Age: 58
End: 2023-08-28
Payer: COMMERCIAL

## 2023-08-28 ENCOUNTER — TELEPHONE (OUTPATIENT)
Dept: PRIMARY CARE | Facility: CLINIC | Age: 58
End: 2023-08-28
Payer: COMMERCIAL

## 2023-08-28 NOTE — TELEPHONE ENCOUNTER
Pt called ldm on v/m requesting Rx refill on   Wegovy  Pen.  Called [facility] to check on her   refills.  She hads 2 refills on file.

## 2023-08-28 NOTE — TELEPHONE ENCOUNTER
Mirtha Faria has been denied for prior authorization for Wegovy 2.4 mg . Patient has been contacted regarding the status of their prior authorization.     Reason for Denial: Patient has not achieved greater than or equal to 5% of weight loss from baseline.     An appeal has been submitted on the patient's behalf as of 8/28/2023. Any additional information may be provided to the appeal within 15 days.      Case ID: [id number]    Information resubmitted on 9/1/2023 with patient noting on 8/30/2023 a weight of 325.0 lb. Prior noted weight was 331 lb on 7/14/2023.     Raj Castro, PharmD  Clinical Pharmacist  [phone]

## 2023-09-01 ENCOUNTER — TELEPHONE (OUTPATIENT)
Dept: PHARMACY | Facility: HOSPITAL | Age: 58
End: 2023-09-01
Payer: COMMERCIAL

## 2023-09-01 NOTE — TELEPHONE ENCOUNTER
Level 2 appeal submitted on 9/1/2023 for Wegovy 2.4 mg    Approval Duration: 08/02/2023-08/31/2024    Raj Castro, PharmD  [phone]

## 2023-09-01 NOTE — TELEPHONE ENCOUNTER
Spoke to patient today, Matthias is requiring a prior authorization/necessity for appeal. Currently in progress.

## 2023-10-09 ENCOUNTER — OFFICE VISIT (OUTPATIENT)
Dept: PRIMARY CARE | Facility: CLINIC | Age: 58
End: 2023-10-09
Payer: COMMERCIAL

## 2023-10-09 VITALS
HEIGHT: 70 IN | SYSTOLIC BLOOD PRESSURE: 128 MMHG | DIASTOLIC BLOOD PRESSURE: 86 MMHG | TEMPERATURE: 97.8 F | OXYGEN SATURATION: 97 % | BODY MASS INDEX: 41.95 KG/M2 | WEIGHT: 293 LBS | HEART RATE: 88 BPM

## 2023-10-09 DIAGNOSIS — K08.109 TOOTH LOSS: ICD-10-CM

## 2023-10-09 DIAGNOSIS — I10 BENIGN ESSENTIAL HYPERTENSION: Primary | ICD-10-CM

## 2023-10-09 DIAGNOSIS — E78.2 MIXED HYPERLIPIDEMIA: ICD-10-CM

## 2023-10-09 PROCEDURE — 3008F BODY MASS INDEX DOCD: CPT | Performed by: INTERNAL MEDICINE

## 2023-10-09 PROCEDURE — 1036F TOBACCO NON-USER: CPT | Performed by: INTERNAL MEDICINE

## 2023-10-09 PROCEDURE — 3074F SYST BP LT 130 MM HG: CPT | Performed by: INTERNAL MEDICINE

## 2023-10-09 PROCEDURE — 99214 OFFICE O/P EST MOD 30 MIN: CPT | Performed by: INTERNAL MEDICINE

## 2023-10-09 PROCEDURE — 3079F DIAST BP 80-89 MM HG: CPT | Performed by: INTERNAL MEDICINE

## 2023-10-09 ASSESSMENT — PATIENT HEALTH QUESTIONNAIRE - PHQ9
SUM OF ALL RESPONSES TO PHQ9 QUESTIONS 1 AND 2: 0
1. LITTLE INTEREST OR PLEASURE IN DOING THINGS: NOT AT ALL
2. FEELING DOWN, DEPRESSED OR HOPELESS: NOT AT ALL

## 2023-10-09 NOTE — PROGRESS NOTES
Subjective   Patient ID: Mirtha Faria is a 58 y.o. female who presents for presurgical clearance (EP.  Pre-surgical clearance. Tooth pulled 10/19.).  HPI  Patient presents today for preop clearance.  She is having 2 teeth work done by the oral surgeon and needs twilight anesthesia.  She will be in the process of getting dental implants in the next 6 months when things heal up.  She is on blood pressure medication.  She takes cholesterol meds.  She is on Wegovy for weight loss through an outpatient weight loss clinic.  Patient's been feeling well she denies chest pains headaches dizziness lightheadedness shortness of breath she does not have any change in her chronic lower extremity edema he has no GI or  symptoms.  She has had no cold symptoms.  She has had previous twilight sleep with colonoscopy has not done very well with them without any issues.  She checks her home blood pressure periodically and has been running good.  It is good here in the office today.  She is succeeded in losing quite a bit of weight with the semaglutide and she continues on that program.    Review of Systems  GENERAL - Denies fever, fatigue or chills  SKIN - Denies rash, new skin lesions, or change in moles  EYES - Denies blurred vision, or change in visual acuity  EARS - Denies ear pain, discharge, ringing, or difficulty hearing  NOSE - Denies nasal congestion, discharge, or bleeding  MOUTH - Denies sore throat, postnasal drip or painful/difficulty swallowing  NECK - Denies pain or swelling  RESPIRATORY - Denies shortness of breath, cough, wheezing  CARDIOVASCULAR - Denies palpitations, chest pain, orthopnea, peripheral edema, syncope or claudication  GASTROINTESTINAL - Denies nausea, vomiting, diarrhea, constipation, abdominal pain, melena and or bright red blood  GENITOURINARY - Denies dysuria, frequency of urination, urgency, or hesitancy  MUSCULOSKELETAL - Denies joint or muscle pain, or back pain  NEUROLOGICAL - Denies localized  "numbness, weakness, or tingling  PSYCHIATRIC - Denies depression, anxiety, substance abuse, suicidal or homicidal ideation  ENDOCRINE - Denies heat or cold intolerance, weight loss or gain, increasing thirst  HEMATO-IMMUNOLOGIC - Denies easy bruising, bleeding, oral ulcerations or recurrent infections     Objective   /86   Pulse 88   Temp 36.6 °C (97.8 °F) (Oral)   Ht 1.778 m (5' 10\")   Wt 147 kg (324 lb)   SpO2 97%   BMI 46.49 kg/m²    Physical Exam  HEENT is normal  Lungs clear bilaterally  Heart is regular rate rhythm no murmurs  Abdomen benign  Lower extremities no edema  Skin is warm and dry no rashes  Neuro is grossly intact  Assessment/Plan   Diagnoses and all orders for this visit:  Benign essential hypertension  Mixed hyperlipidemia  Tooth loss    Call with issues  She is considered low risk for surgery and can proceed  She will follow-up with me for her routine  She will continue on her routine medications and continue to monitor blood pressure  She will let her surgeon know if she develops any illnesses between now and the time of surgery otherwise she will let me know if she has issues  Yolie Serra MD   "

## 2023-10-23 DIAGNOSIS — E66.01 MORBID OBESITY WITH BMI OF 45.0-49.9, ADULT (MULTI): ICD-10-CM

## 2023-10-24 ENCOUNTER — OFFICE (OUTPATIENT)
Dept: URBAN - METROPOLITAN AREA CLINIC 27 | Facility: CLINIC | Age: 58
End: 2023-10-24
Payer: COMMERCIAL

## 2023-10-24 VITALS
HEART RATE: 91 BPM | DIASTOLIC BLOOD PRESSURE: 83 MMHG | SYSTOLIC BLOOD PRESSURE: 150 MMHG | TEMPERATURE: 97.9 F | HEIGHT: 70 IN | WEIGHT: 293 LBS

## 2023-10-24 DIAGNOSIS — K21.9 GASTRO-ESOPHAGEAL REFLUX DISEASE WITHOUT ESOPHAGITIS: ICD-10-CM

## 2023-10-24 DIAGNOSIS — D64.9 ANEMIA, UNSPECIFIED: ICD-10-CM

## 2023-10-24 DIAGNOSIS — Z86.010 PERSONAL HISTORY OF COLONIC POLYPS: ICD-10-CM

## 2023-10-24 PROCEDURE — 99204 OFFICE O/P NEW MOD 45 MIN: CPT | Performed by: INTERNAL MEDICINE

## 2023-10-25 ENCOUNTER — TELEMEDICINE (OUTPATIENT)
Dept: PHARMACY | Facility: HOSPITAL | Age: 58
End: 2023-10-25
Payer: COMMERCIAL

## 2023-10-25 DIAGNOSIS — E66.01 MORBID OBESITY WITH BMI OF 45.0-49.9, ADULT (MULTI): ICD-10-CM

## 2023-10-25 RX ORDER — SEMAGLUTIDE 2.4 MG/.75ML
2.4 INJECTION, SOLUTION SUBCUTANEOUS
Qty: 3 ML | Refills: 11 | Status: SHIPPED | OUTPATIENT
Start: 2023-10-25 | End: 2024-05-29 | Stop reason: ALTCHOICE

## 2023-10-25 RX ORDER — SEMAGLUTIDE 2.4 MG/.75ML
INJECTION, SOLUTION SUBCUTANEOUS
Qty: 4 ML | Refills: 0 | OUTPATIENT
Start: 2023-10-25

## 2023-10-25 NOTE — PROGRESS NOTES
Patient is sent at the request of Yolie Serra MD for my opinion regarding weight loss.  My final recommendations will be communicated back to the requesting provider by way of shared medical record.     Subjective     Mirtha Faria is a 58 y.o. year old female patient with class III obesity (BMI 46.82 kg/m2) complicated by dyslipidemia and hypertension referred for weight loss management. She was referred for initiation of a once-weekly GLP1ra and has been titrated to a dose of Wegovy 2.4 mg once weekly.    The patient does have a known family history of diabetes.    Allergies   Allergen Reactions    Hydrochlorothiazide Rash     Diet:   - See previous discussion on diet  - Notes that she is trying to remaining consistent with protein and vegetable intake  - Reduced portion and less frequent eating  - Discussed potential for a dietician visit    Weight:     Digital home scale used     - 12/21/22 was 342 lb   - 4/12/23 ~337lb last she remembers  - 5/1/23: Has not been weighing but notices that her clothes are bigger, feels the weight loss  - 6/1/23- notices clothes are fitting better, 332 lbs   - 6/28/23- feels like things are fitting better.  She forgot to weight herself today.   - 7/14/23 - 331 lb (Office visit)  - 10/9/2023 - 324 lb (Office visit)  - 10/25/2023 - 311 (Home scale)    Exercise:   - Gym with her son   - Dog walking   - Tries to 30-40 minutes of exercise 7 days per week     Objective     There were no vitals taken for this visit.    Current Pharmacotherapy:    - Wegovy 2.4 mg: inject 2.4 mg once weekly on Sundays     Side Effects:   - No noted adverse effects on 7/26/2023    Lab Review  Lab Results   Component Value Date    BILITOT 0.5 07/10/2023    CALCIUM 9.6 07/10/2023    CO2 27 07/10/2023     07/10/2023    CREATININE 0.88 07/10/2023    GLUCOSE 92 07/10/2023    ALKPHOS 73 07/10/2023    K 4.5 07/10/2023    PROT 6.7 07/10/2023     07/10/2023    AST 10 07/10/2023    ALT 15 07/10/2023     BUN 12 07/10/2023    ANIONGAP 14 07/10/2023    ALBUMIN 4.0 07/10/2023    GFRF 77 07/10/2023     Lab Results   Component Value Date    TRIG 146 07/10/2023    CHOL 156 07/10/2023    HDL 49.1 07/10/2023     Component      Latest Ref Rng 7/10/2023   LDL      0 - 99 mg/dL 78      Pertinent PMH Review:  PMH of Pancreatitis: No   PMH of Retinopathy: No   PMH of MTC/MEN Type II: No    Drug-drug interactions:  No significant drug interactions that require medication adjustment at this time.     Assessment/Plan   Problem List Items Addressed This Visit       Morbid obesity with BMI of 45.0-49.9, adult (CMS/[facility])    Relevant Medications    semaglutide, weight loss, (Wegovy) 2.4 mg/0.75 mL pen injector    Other Relevant Orders    Follow Up In Advanced [facility] - Pharmacy     RX changes:    Continue: Wegovy 2.4 mg once weekly on Sundays  Most recent in office weight is 324 lb on 10/9/2023 versus home scale reading most recently was 311 lb  1 year supply of Wegovy 2.4 mg sent to [facility]  All medications are dosed appropriate for renal and hepatic function   Compliance at present is estimated to be excellent. Efforts to improve compliance (if necessary) will be directed at increased exercise.  Follow up: I recommend  weight loss follow up be: 10/25/2023 at 8:30 am    Raj Castro, PharmD  [phone]    Continue all meds under the continuation of care with the referring provider and clinical pharmacy team.

## 2023-12-13 VITALS
OXYGEN SATURATION: 93 % | DIASTOLIC BLOOD PRESSURE: 60 MMHG | RESPIRATION RATE: 20 BRPM | SYSTOLIC BLOOD PRESSURE: 130 MMHG | HEART RATE: 87 BPM | SYSTOLIC BLOOD PRESSURE: 95 MMHG | DIASTOLIC BLOOD PRESSURE: 62 MMHG | SYSTOLIC BLOOD PRESSURE: 97 MMHG | HEART RATE: 92 BPM | OXYGEN SATURATION: 91 % | SYSTOLIC BLOOD PRESSURE: 95 MMHG | RESPIRATION RATE: 24 BRPM | HEART RATE: 74 BPM | SYSTOLIC BLOOD PRESSURE: 135 MMHG | OXYGEN SATURATION: 90 % | OXYGEN SATURATION: 88 % | TEMPERATURE: 97.2 F | HEART RATE: 83 BPM | OXYGEN SATURATION: 95 % | HEART RATE: 89 BPM | HEIGHT: 70 IN | DIASTOLIC BLOOD PRESSURE: 57 MMHG | SYSTOLIC BLOOD PRESSURE: 135 MMHG | HEART RATE: 84 BPM | WEIGHT: 293 LBS | DIASTOLIC BLOOD PRESSURE: 74 MMHG | SYSTOLIC BLOOD PRESSURE: 107 MMHG | HEART RATE: 94 BPM | DIASTOLIC BLOOD PRESSURE: 62 MMHG | DIASTOLIC BLOOD PRESSURE: 72 MMHG | DIASTOLIC BLOOD PRESSURE: 53 MMHG | SYSTOLIC BLOOD PRESSURE: 117 MMHG | RESPIRATION RATE: 25 BRPM | HEART RATE: 94 BPM | SYSTOLIC BLOOD PRESSURE: 97 MMHG | RESPIRATION RATE: 27 BRPM | DIASTOLIC BLOOD PRESSURE: 63 MMHG | HEART RATE: 87 BPM | HEART RATE: 89 BPM | SYSTOLIC BLOOD PRESSURE: 130 MMHG | HEART RATE: 84 BPM | HEIGHT: 70 IN | DIASTOLIC BLOOD PRESSURE: 74 MMHG | DIASTOLIC BLOOD PRESSURE: 63 MMHG | HEIGHT: 70 IN | RESPIRATION RATE: 16 BRPM | DIASTOLIC BLOOD PRESSURE: 71 MMHG | RESPIRATION RATE: 24 BRPM | SYSTOLIC BLOOD PRESSURE: 108 MMHG | HEART RATE: 89 BPM | TEMPERATURE: 97.2 F | SYSTOLIC BLOOD PRESSURE: 112 MMHG | HEART RATE: 87 BPM | OXYGEN SATURATION: 91 % | RESPIRATION RATE: 16 BRPM | HEART RATE: 74 BPM | RESPIRATION RATE: 16 BRPM | HEART RATE: 83 BPM | RESPIRATION RATE: 12 BRPM | HEART RATE: 92 BPM | SYSTOLIC BLOOD PRESSURE: 90 MMHG | DIASTOLIC BLOOD PRESSURE: 66 MMHG | SYSTOLIC BLOOD PRESSURE: 107 MMHG | OXYGEN SATURATION: 94 % | SYSTOLIC BLOOD PRESSURE: 111 MMHG | OXYGEN SATURATION: 90 % | TEMPERATURE: 97.2 F | HEART RATE: 92 BPM | RESPIRATION RATE: 21 BRPM | HEART RATE: 79 BPM | DIASTOLIC BLOOD PRESSURE: 57 MMHG | SYSTOLIC BLOOD PRESSURE: 90 MMHG | RESPIRATION RATE: 27 BRPM | RESPIRATION RATE: 17 BRPM | OXYGEN SATURATION: 97 % | SYSTOLIC BLOOD PRESSURE: 107 MMHG | OXYGEN SATURATION: 88 % | OXYGEN SATURATION: 97 % | SYSTOLIC BLOOD PRESSURE: 95 MMHG | RESPIRATION RATE: 20 BRPM | OXYGEN SATURATION: 88 % | SYSTOLIC BLOOD PRESSURE: 108 MMHG | HEART RATE: 91 BPM | HEART RATE: 94 BPM | DIASTOLIC BLOOD PRESSURE: 71 MMHG | SYSTOLIC BLOOD PRESSURE: 135 MMHG | RESPIRATION RATE: 12 BRPM | HEART RATE: 74 BPM | OXYGEN SATURATION: 93 % | HEART RATE: 75 BPM | SYSTOLIC BLOOD PRESSURE: 112 MMHG | RESPIRATION RATE: 21 BRPM | HEART RATE: 79 BPM | DIASTOLIC BLOOD PRESSURE: 71 MMHG | DIASTOLIC BLOOD PRESSURE: 62 MMHG | DIASTOLIC BLOOD PRESSURE: 66 MMHG | SYSTOLIC BLOOD PRESSURE: 112 MMHG | RESPIRATION RATE: 21 BRPM | DIASTOLIC BLOOD PRESSURE: 53 MMHG | HEART RATE: 83 BPM | HEART RATE: 91 BPM | OXYGEN SATURATION: 96 % | RESPIRATION RATE: 25 BRPM | HEART RATE: 79 BPM | HEART RATE: 75 BPM | DIASTOLIC BLOOD PRESSURE: 72 MMHG | SYSTOLIC BLOOD PRESSURE: 130 MMHG | SYSTOLIC BLOOD PRESSURE: 97 MMHG | RESPIRATION RATE: 12 BRPM | RESPIRATION RATE: 14 BRPM | DIASTOLIC BLOOD PRESSURE: 63 MMHG | RESPIRATION RATE: 17 BRPM | RESPIRATION RATE: 14 BRPM | DIASTOLIC BLOOD PRESSURE: 60 MMHG | HEART RATE: 75 BPM | OXYGEN SATURATION: 97 % | OXYGEN SATURATION: 94 % | OXYGEN SATURATION: 96 % | RESPIRATION RATE: 20 BRPM | HEART RATE: 91 BPM | OXYGEN SATURATION: 96 % | OXYGEN SATURATION: 93 % | SYSTOLIC BLOOD PRESSURE: 117 MMHG | DIASTOLIC BLOOD PRESSURE: 53 MMHG | SYSTOLIC BLOOD PRESSURE: 90 MMHG | SYSTOLIC BLOOD PRESSURE: 111 MMHG | RESPIRATION RATE: 27 BRPM | OXYGEN SATURATION: 91 % | OXYGEN SATURATION: 95 % | SYSTOLIC BLOOD PRESSURE: 117 MMHG | DIASTOLIC BLOOD PRESSURE: 60 MMHG | OXYGEN SATURATION: 90 % | SYSTOLIC BLOOD PRESSURE: 108 MMHG | DIASTOLIC BLOOD PRESSURE: 66 MMHG | WEIGHT: 293 LBS | HEART RATE: 84 BPM | WEIGHT: 293 LBS | RESPIRATION RATE: 17 BRPM | DIASTOLIC BLOOD PRESSURE: 57 MMHG | RESPIRATION RATE: 14 BRPM | SYSTOLIC BLOOD PRESSURE: 111 MMHG | RESPIRATION RATE: 25 BRPM | OXYGEN SATURATION: 94 % | DIASTOLIC BLOOD PRESSURE: 72 MMHG | RESPIRATION RATE: 24 BRPM | OXYGEN SATURATION: 95 % | DIASTOLIC BLOOD PRESSURE: 74 MMHG

## 2023-12-20 ENCOUNTER — OFFICE (OUTPATIENT)
Dept: URBAN - METROPOLITAN AREA PATHOLOGY 2 | Facility: PATHOLOGY | Age: 58
End: 2023-12-20
Payer: COMMERCIAL

## 2023-12-20 ENCOUNTER — AMBULATORY SURGICAL CENTER (OUTPATIENT)
Dept: URBAN - METROPOLITAN AREA SURGERY 12 | Facility: SURGERY | Age: 58
End: 2023-12-20
Payer: COMMERCIAL

## 2023-12-20 DIAGNOSIS — Z86.010 PERSONAL HISTORY OF COLONIC POLYPS: ICD-10-CM

## 2023-12-20 DIAGNOSIS — D64.9 ANEMIA, UNSPECIFIED: ICD-10-CM

## 2023-12-20 DIAGNOSIS — K57.30 DIVERTICULOSIS OF LARGE INTESTINE WITHOUT PERFORATION OR ABS: ICD-10-CM

## 2023-12-20 DIAGNOSIS — K64.8 OTHER HEMORRHOIDS: ICD-10-CM

## 2023-12-20 PROCEDURE — 45380 COLONOSCOPY AND BIOPSY: CPT | Performed by: INTERNAL MEDICINE

## 2023-12-20 PROCEDURE — 88305 TISSUE EXAM BY PATHOLOGIST: CPT | Performed by: PATHOLOGY

## 2024-01-10 ENCOUNTER — TELEMEDICINE (OUTPATIENT)
Dept: PHARMACY | Facility: HOSPITAL | Age: 59
End: 2024-01-10
Payer: COMMERCIAL

## 2024-01-10 DIAGNOSIS — E66.01 MORBID OBESITY WITH BMI OF 45.0-49.9, ADULT (MULTI): ICD-10-CM

## 2024-01-10 NOTE — PROGRESS NOTES
Patient is sent at the request of Yolie Serra MD regarding weight loss.  My final recommendations will be communicated back to the requesting provider by way of shared medical record.     Subjective     Mirtha Faria is a 58 y.o. year old female patient with class III obesity (BMI 46.82 kg/m2) complicated by dyslipidemia and hypertension referred for weight loss management. She was referred for initiation of a once-weekly GLP1ra and has been titrated to a dose of Wegovy 2.4 mg once weekly.    The patient does have a known family history of diabetes.    Allergies   Allergen Reactions    Hydrochlorothiazide Rash     Diet:   - Previously discussed potential for a dietician visit  - Breakfast: black coffee, Premier protein shake  - Lunch: leftovers (salmon, chicken, meatloaf)  -Dinner: protein, a starch, and a vegetable (meal varies)  -Snacks (evening): Premier protein cereal, protein pancakes    -Notes she is not experiencing decreased appetite as often as she was before, about 2 days per week she will have decrease appetite.   -Notes her diet has been less consistent due to the holiday season, but is back to her regular diet as of today's visit.    Weight:     Digital home scale used     - 12/21/22 was 342 lb   - 4/12/23 ~337lb last she remembers  - 5/1/23: Has not been weighing but notices that her clothes are bigger, feels the weight loss  - 6/1/23- notices clothes are fitting better, 332 lbs   - 6/28/23- feels like things are fitting better.  She forgot to weight herself today.   - 7/14/23 - 331 lb (Office visit)  - 10/9/2023 - 324 lb (Office visit)  - 10/25/2023 - 311 (Home scale)  - 1/10/2023 - 303 (Home scale)    Exercise:   - Gym with her son   - Dog walking   - Tries to 30-40 minutes of exercise 7 days per week   - Has been exercising less over the holidays but is starting again    Objective     There were no vitals taken for this visit.    Current Pharmacotherapy:    - Wegovy 2.4 mg: inject 2.4 mg once  weekly on Sundays     Side Effects:   - Patient states she is not experiencing adverse effects at this time     Lab Review  Lab Results   Component Value Date    BILITOT 0.5 07/10/2023    CALCIUM 9.6 07/10/2023    CO2 27 07/10/2023     07/10/2023    CREATININE 0.88 07/10/2023    GLUCOSE 92 07/10/2023    ALKPHOS 73 07/10/2023    K 4.5 07/10/2023    PROT 6.7 07/10/2023     07/10/2023    AST 10 07/10/2023    ALT 15 07/10/2023    BUN 12 07/10/2023    ANIONGAP 14 07/10/2023    ALBUMIN 4.0 07/10/2023    GFRF 77 07/10/2023     Lab Results   Component Value Date    TRIG 146 07/10/2023    CHOL 156 07/10/2023    HDL 49.1 07/10/2023     Component      Latest Ref Rng 7/10/2023   LDL      0 - 99 mg/dL 78      Pertinent PMH Review:  PMH of Pancreatitis: No   PMH of Retinopathy: No   PMH of MTC/MEN Type II: No    Drug-drug interactions:  No significant drug interactions that require medication adjustment at this time.     Assessment/Plan   Problem List Items Addressed This Visit       Morbid obesity with BMI of 45.0-49.9, adult (CMS/[facility])     Patient is experiencing continued weight loss of Wegovy 2.4mg once weekly. Notes additional 8 lbs of weight loss despite diet/exercise changes over the holiday season. Will continue at current dose due to continued weight loss and tolerability of medication. Will follow up in 1 month to evaluate continued benefit of medication.         Relevant Orders    Follow Up In Advanced [facility] - Pharmacy       RX changes:    Continue: Wegovy 2.4 mg once weekly on Sundays  Most recent in office weight is 324 lb on 10/9/2023 versus home scale reading most recently was 303 lbs on 1/10/2024.  All medications are dosed appropriate for renal and hepatic function   Compliance at present is estimated to be excellent. Efforts to improve compliance (if necessary) will be directed at increased exercise.  Follow up: I recommend  weight loss follow up be: 1 month: 2/7/2023 @ 8:30AM    Meghna Duggan  Pharm D  PGY-1 Pharmacy Resident, [facility]     Continue all meds under the continuation of care with the referring provider and clinical pharmacy team.

## 2024-01-10 NOTE — ASSESSMENT & PLAN NOTE
Patient is experiencing continued weight loss of Wegovy 2.4mg once weekly. Notes additional 8 lbs of weight loss despite diet/exercise changes over the holiday season. Will continue at current dose due to continued weight loss and tolerability of medication. Will follow up in 1 month to evaluate continued benefit of medication.

## 2024-01-11 NOTE — PROGRESS NOTES
I reviewed the progress note and agree with the resident’s findings and plans as written. Case discussed with resident.    Kevin Waldron, PharmD

## 2024-02-07 ENCOUNTER — TELEMEDICINE (OUTPATIENT)
Dept: PHARMACY | Facility: HOSPITAL | Age: 59
End: 2024-02-07
Payer: COMMERCIAL

## 2024-02-07 DIAGNOSIS — E66.01 MORBID OBESITY WITH BMI OF 45.0-49.9, ADULT (MULTI): ICD-10-CM

## 2024-02-07 NOTE — ASSESSMENT & PLAN NOTE
Patient is tolerating Wegovy 2.4mg once weekly for weight loss, has lost a total of 39 pounds since initiation of therapy. Despite not seeing additional weight loss in the previous 4 weeks, will continue at this dose for additional weight loss benefit due to previous weight loss and tolerability of medication. Will follow up in 2 months per patient request to evaluate further weight loss and tolerability of medication.

## 2024-02-07 NOTE — PROGRESS NOTES
Patient is sent at the request of Yolie Serra MD regarding weight loss.  My final recommendations will be communicated back to the requesting provider by way of shared medical record.     Subjective     Mirtha Faria is a 58 y.o. year old female patient with class III obesity (BMI 46.82 kg/m2) complicated by dyslipidemia and hypertension referred for weight loss management. She was referred for initiation of a once-weekly GLP1ra and has been titrated to a dose of Wegovy 2.4 mg once weekly.    The patient does have a known family history of diabetes.    Allergies   Allergen Reactions    Hydrochlorothiazide Rash     Diet:   - Previously discussed potential for a dietician visit  - Breakfast: black coffee, Premier protein shake  - Lunch: leftovers (salmon, chicken, meatloaf)  -Dinner: protein, a starch, and a vegetable (meal varies)  -Snacks (evening): Premier protein cereal, protein pancakes    -Notes she is not experiencing decreased appetite as often as she was before, about 2 days per week she will have decrease appetite.   -Note she has been trying to decrease the amount of sugar she has been eating. Patient reports she has been trying to go for a walk or exercise instead when she feels the craving for a sweet snack.    Weight:     Digital home scale used     - 12/21/22 was 342 lb   - 4/12/23 ~337lb last she remembers  - 5/1/23: Has not been weighing but notices that her clothes are bigger, feels the weight loss  - 6/1/23- notices clothes are fitting better, 332 lbs   - 6/28/23- feels like things are fitting better.  She forgot to weight herself today.   - 7/14/23 - 331 lb (Office visit)  - 10/9/2023 - 324 lb (Office visit)  - 10/25/2023 - 311 (Home scale)  - 1/10/2024 - 303 (Home scale)  - 2/7/2024 -303 (Home scale)    Exercise:   - Gym with her son   - Dog walking   - Tries to 30-40 minutes of exercise 7 days per week     Objective     There were no vitals taken for this visit.    Current Pharmacotherapy:     - Wegovy 2.4 mg: inject 2.4 mg once weekly on Sundays     Adverse Effects:   - Patient states she is not experiencing adverse effects at this time     Lab Review  Lab Results   Component Value Date    BILITOT 0.5 07/10/2023    CALCIUM 9.6 07/10/2023    CO2 27 07/10/2023     07/10/2023    CREATININE 0.88 07/10/2023    GLUCOSE 92 07/10/2023    ALKPHOS 73 07/10/2023    K 4.5 07/10/2023    PROT 6.7 07/10/2023     07/10/2023    AST 10 07/10/2023    ALT 15 07/10/2023    BUN 12 07/10/2023    ANIONGAP 14 07/10/2023    ALBUMIN 4.0 07/10/2023    GFRF 77 07/10/2023     Lab Results   Component Value Date    TRIG 146 07/10/2023    CHOL 156 07/10/2023    HDL 49.1 07/10/2023     Component      Latest Ref Rng 7/10/2023   LDL      0 - 99 mg/dL 78      Pertinent PMH Review:  PMH of Pancreatitis: No   PMH of Retinopathy: No   PMH of MTC/MEN Type II: No    Drug-drug interactions:  No significant drug interactions that require medication adjustment at this time.     Assessment/Plan   Problem List Items Addressed This Visit       Morbid obesity with BMI of 45.0-49.9, adult (CMS/[facility])     Patient is tolerating Wegovy 2.4mg once weekly for weight loss, has lost a total of 39 pounds since initiation of therapy. Despite not seeing additional weight loss in the previous 4 weeks, will continue at this dose for additional weight loss benefit due to previous weight loss and tolerability of medication. Will follow up in 2 months per patient request to evaluate further weight loss and tolerability of medication.          Relevant Orders    Follow Up In [facility] - Pharmacy       RX changes:    Continue: Wegovy 2.4 mg once weekly on Sundays  Most recent in office weight is 324 lb on 10/9/2023 versus home scale reading most recently was 303 lbs on 2/7/2024.  All medications are dosed appropriate for renal and hepatic function   Compliance at present is estimated to be excellent. Efforts to improve compliance (if necessary)  will be directed at increased exercise.  Follow up: I recommend  weight loss follow up be: 2 months: 4/3/2024 @ 8:30AM    Meghna Duggan, Pharm D  PGY-1 Pharmacy Resident, [facility]     Continue all meds under the continuation of care with the referring provider and clinical pharmacy team.

## 2024-02-24 DIAGNOSIS — I10 BENIGN ESSENTIAL HYPERTENSION: ICD-10-CM

## 2024-02-24 DIAGNOSIS — E78.5 HYPERLIPIDEMIA, UNSPECIFIED HYPERLIPIDEMIA TYPE: ICD-10-CM

## 2024-02-26 NOTE — TELEPHONE ENCOUNTER
Pharmacy called requesting Rx refill on   Lisinopril 20 mg   Metoprolol XL 50 mg   Simvastatin 40 mg   [facility] to [facility]   Last seen 10/09/23  None Scheduled     Pharmacy requested a 90 day supply

## 2024-02-27 RX ORDER — LISINOPRIL 20 MG/1
20 TABLET ORAL DAILY
Qty: 90 TABLET | Refills: 0 | Status: SHIPPED | OUTPATIENT
Start: 2024-02-27 | End: 2024-06-05 | Stop reason: SDUPTHER

## 2024-02-27 RX ORDER — METOPROLOL SUCCINATE 50 MG/1
50 TABLET, EXTENDED RELEASE ORAL DAILY
Qty: 90 TABLET | Refills: 0 | Status: SHIPPED | OUTPATIENT
Start: 2024-02-27 | End: 2024-06-05 | Stop reason: SDUPTHER

## 2024-02-27 RX ORDER — SIMVASTATIN 40 MG/1
40 TABLET, FILM COATED ORAL DAILY
Qty: 90 TABLET | Refills: 0 | Status: SHIPPED | OUTPATIENT
Start: 2024-02-27 | End: 2024-06-05 | Stop reason: SDUPTHER

## 2024-04-02 NOTE — PROGRESS NOTES
Patient is sent at the request of Yolie Serra MD regarding weight loss.  My final recommendations will be communicated back to the requesting provider by way of shared medical record.     Subjective     Mirtha Faria is a 58 y.o. year old female patient with class III obesity (BMI 46.82 kg/m2) complicated by dyslipidemia and hypertension referred for weight loss management. She was referred for initiation of a once-weekly GLP1ra and has been titrated to a dose of Wegovy 2.4 mg once weekly.    The patient does have a known family history of diabetes.    Allergies   Allergen Reactions    Hydrochlorothiazide Rash     Diet:   - Previously discussed potential for a dietician visit  - Breakfast: black coffee, Premier protein shake  - Lunch: leftovers (salmon, chicken, meatloaf)  -Dinner: protein, a starch, and a vegetable (meal varies)  -Snacks (evening): Premier protein cereal, protein pancakes    -Notes she is not experiencing decreased appetite as often as she was before, about 2 days per week she will have decrease appetite.   -Note she has been trying to decrease the amount of sugar she has been eating. Patient reports she has been trying to go for a walk or exercise instead when she feels the craving for a sweet snack.    Weight:     Digital home scale used     - 12/21/22 was 342 lb   - 4/12/23 ~337lb last she remembers  - 5/1/23: Has not been weighing but notices that her clothes are bigger, feels the weight loss  - 6/1/23- notices clothes are fitting better, 332 lbs   - 6/28/23- feels like things are fitting better.  She forgot to weight herself today.   - 7/14/23 - 331 lb (Office visit)  - 10/9/2023 - 324 lb (Office visit)  - 10/25/2023 - 311 (Home scale)  - 1/10/2024 - 303 (Home scale)  - 2/7/2024 -303 (Home scale)  - 4/3/2024 304 (Home scale)    Patient states though she is not seeing a change in her weight on the scale, she is noticing a difference in her appearance. States she has noticed her arms are  more toned since going to the gym more often, and is able to wear clothes that previously did not fit.    Exercise:   - Gym with her son   - Dog walking   - Tries to 30-40 minutes of exercise 7 days per week   - Has been going to the gym more often recently    Objective     There were no vitals taken for this visit.    Current Pharmacotherapy:    - Wegovy 2.4 mg: inject 2.4 mg once weekly on Sundays     Adverse Effects:   - Patient states she is experiencing some constipation a few days after her injection, then will have looser stools. Will resolve prior to next dose. Not intolerable per patient. Also states sometimes experiencing bloating, but is unsure if she had been eating fatty or greasy foods prior to this.    Lab Review  Lab Results   Component Value Date    BILITOT 0.5 07/10/2023    CALCIUM 9.6 07/10/2023    CO2 27 07/10/2023     07/10/2023    CREATININE 0.88 07/10/2023    GLUCOSE 92 07/10/2023    ALKPHOS 73 07/10/2023    K 4.5 07/10/2023    PROT 6.7 07/10/2023     07/10/2023    AST 10 07/10/2023    ALT 15 07/10/2023    BUN 12 07/10/2023    ANIONGAP 14 07/10/2023    ALBUMIN 4.0 07/10/2023    GFRF 77 07/10/2023     Lab Results   Component Value Date    TRIG 146 07/10/2023    CHOL 156 07/10/2023    HDL 49.1 07/10/2023     Component      Latest Ref Rng 7/10/2023   LDL      0 - 99 mg/dL 78      Pertinent PMH Review:  PMH of Pancreatitis: No   PMH of Retinopathy: No   PMH of MTC/MEN Type II: No    Drug-drug interactions:  No significant drug interactions that require medication adjustment at this time.     Assessment/Plan   Problem List Items Addressed This Visit       Morbid obesity with BMI of 45.0-49.9, adult (CMS/[facility])     Patient's weight appears to be stable with no additional weight loss since last pharmacy visit while on Wegovy 2.4mg weekly. Will continue therapy at this time for maintenance of current weight. Pharmacist to initiate prior authorization for Zepbound treatment for further weight  loss benefit.         Relevant Orders    Follow Up In [facility] - Pharmacy       RX changes:    Continue: Wegovy 2.4 mg once weekly on Sundays. Pharmacist to initiate Zepbound PA.  Most recent in office weight is 324 lb on 10/9/2023 versus home scale reading most recently was 304 lbs reported on 4/3/2024.  All medications are dosed appropriate for renal and hepatic function   Compliance at present is estimated to be excellent. Efforts to improve compliance (if necessary) will be directed at increased exercise.  Follow up: I recommend  weight loss follow up be: 1 month: 5/1/24 @ 8:30AM    Carlos CastleD  PGY-1 Pharmacy Resident, [facility]     Continue all meds under the continuation of care with the referring provider and clinical pharmacy team.

## 2024-04-03 ENCOUNTER — TELEMEDICINE (OUTPATIENT)
Dept: PHARMACY | Facility: HOSPITAL | Age: 59
End: 2024-04-03
Payer: COMMERCIAL

## 2024-04-03 DIAGNOSIS — E66.01 MORBID OBESITY WITH BMI OF 45.0-49.9, ADULT (MULTI): ICD-10-CM

## 2024-04-03 NOTE — ASSESSMENT & PLAN NOTE
Patient's weight appears to be stable with no additional weight loss since last pharmacy visit while on Wegovy 2.4mg weekly. Will continue therapy at this time for maintenance of current weight. Pharmacist to initiate prior authorization for Zepbound treatment for further weight loss benefit.

## 2024-05-01 ENCOUNTER — TELEMEDICINE (OUTPATIENT)
Dept: PHARMACY | Facility: HOSPITAL | Age: 59
End: 2024-05-01
Payer: COMMERCIAL

## 2024-05-01 DIAGNOSIS — E66.01 MORBID OBESITY WITH BMI OF 45.0-49.9, ADULT (MULTI): ICD-10-CM

## 2024-05-01 NOTE — PROGRESS NOTES
Patient is sent at the request of Yolie Serra MD regarding weight loss.  My final recommendations will be communicated back to the requesting provider by way of shared medical record.     Subjective     Mirtha Faria is a 58 y.o. year old female patient with class III obesity (BMI 46.82 kg/m2) complicated by dyslipidemia and hypertension referred for weight loss management. She was referred for initiation of a once-weekly GLP1ra and has been titrated to a dose of Wegovy 2.4 mg once weekly. Prior Authorization for Zepbound has been approved through 12/25/2024.    The patient does have a known family history of diabetes.    Allergies   Allergen Reactions    Hydrochlorothiazide Rash     Diet:   - Previously discussed potential for a dietician visit  - Breakfast: black coffee, Premier protein shake  - Lunch: leftovers (salmon, chicken, meatloaf)  -Dinner: protein, a starch, and a vegetable (meal varies)  -Snacks (evening): Premier protein cereal, protein pancakes    -Notes she is not experiencing decreased appetite as often as she was before, about 2 days per week she will have decrease appetite.   -Note she has been trying to decrease the amount of sugar she has been eating. Patient reports she has been trying to go for a walk or exercise instead when she feels the craving for a sweet snack.     Weight:     Digital home scale used     - 12/21/22 was 342 lb   - 4/12/23 ~337lb last she remembers  - 5/1/23: Has not been weighing but notices that her clothes are bigger, feels the weight loss  - 6/1/23- notices clothes are fitting better, 332 lbs   - 6/28/23- feels like things are fitting better.  She forgot to weight herself today.   - 7/14/23 - 331 lb (Office visit)  - 10/9/2023 - 324 lb (Office visit)  - 10/25/2023 - 311 (Home scale)  - 1/10/2024 - 303 (Home scale)  - 2/7/2024 -303 (Home scale)  - 4/3/2024 304 (Home scale)  - 5/1/2024 307 (Home scale)    Patient states though she is not seeing a change in her  weight on the scale, she is noticing a difference in her appearance. States she has noticed her arms are more toned since going to the gym more often, and is able to wear clothes that previously did not fit. States she went shopping over the weekend and has gone down a size in her clothing.    Exercise:   - Gym with her son   - Dog walking   - Tries to 30-40 minutes of exercise 7 days per week   - Has been going to the gym more often recently    Objective     There were no vitals taken for this visit.    Current Pharmacotherapy:    - Wegovy 2.4 mg: inject 2.4 mg once weekly on Sundays (patient has 5 pens left)    Adverse Effects:   - Patient states she is experiencing some constipation a few days after her injection, then will have looser stools. Will resolve prior to next dose. Not intolerable per patient. Also states sometimes experiencing bloating, but is unsure if she had been eating fatty or greasy foods prior to this.    Lab Review  Lab Results   Component Value Date    BILITOT 0.5 07/10/2023    CALCIUM 9.6 07/10/2023    CO2 27 07/10/2023     07/10/2023    CREATININE 0.88 07/10/2023    GLUCOSE 92 07/10/2023    ALKPHOS 73 07/10/2023    K 4.5 07/10/2023    PROT 6.7 07/10/2023     07/10/2023    AST 10 07/10/2023    ALT 15 07/10/2023    BUN 12 07/10/2023    ANIONGAP 14 07/10/2023    ALBUMIN 4.0 07/10/2023    GFRF 77 07/10/2023     Lab Results   Component Value Date    TRIG 146 07/10/2023    CHOL 156 07/10/2023    HDL 49.1 07/10/2023     Component      Latest Ref Rng 7/10/2023   LDL      0 - 99 mg/dL 78      Pertinent PMH Review:  PMH of Pancreatitis: No   PMH of Retinopathy: No   PMH of MTC/MEN Type II: No    Drug-drug interactions:  No significant drug interactions that require medication adjustment at this time.     Assessment/Plan   Problem List Items Addressed This Visit       Morbid obesity with BMI of 45.0-49.9, adult (Multi)    Relevant Orders    Follow Up In [facility] - Pharmacy      Patient to call local pharmacies to see if Zepbound 5mg is available prior to changing therapy. Will continue current dose of Wegovy 2.4mg until Zepbound 5 mg stock is available. Patient to call pharmacist for Zepbound initiation if available. Will follow up in 1 month to assess effect of Wegovy or Zepbound if available/initiated.     RX changes:    Continue Wegovy 2.4mg weekly due to supply chain issues with Zepbound.   All medications are dosed appropriate for renal and hepatic function   Patient education: discussed conversion of Wegovy to Zepbound, differences in potency, expectation of weight loss with new pharmacotherapy.  Compliance at present is estimated to be excellent.   Follow up: I recommend  weight loss follow up be: 1 month: 5/29/2024 @ 9:00AM    Carlos CastleD  PGY-1 Pharmacy Resident, [facility]     Continue all meds under the continuation of care with the referring provider and clinical pharmacy team.

## 2024-05-28 NOTE — PROGRESS NOTES
Patient is sent at the request of Yolie Serra MD regarding weight loss.  My final recommendations will be communicated back to the requesting provider by way of shared medical record.     Subjective     Mirtha Faria is a 59 y.o. year old female patient with class III obesity (BMI 46.82 kg/m2) complicated by dyslipidemia and hypertension referred for weight loss management. She was referred for initiation of a once-weekly GLP1ra and has been titrated to a dose of Wegovy 2.4 mg once weekly. At previous visit, deferred initiation of Zepbound due to supply chain issues. Prior Authorization for Zepbound has been approved through 12/25/2024.    The patient does have a known family history of diabetes.    Allergies   Allergen Reactions    Hydrochlorothiazide Rash     Diet:   - Previously discussed potential for a dietician visit  - Breakfast: black coffee, Premier protein shake  - Lunch: leftovers (salmon, chicken, meatloaf)  -Dinner: protein, a starch, and a vegetable (meal varies)  -Snacks (evening): Premier protein cereal, protein pancakes    -Notes she is not experiencing decreased appetite as often as she was before, about 2 days per week she will have decrease appetite.   -Note she has been trying to decrease the amount of sugar she has been eating. Patient reports she has been trying to go for a walk or exercise instead when she feels the craving for a sweet snack.     Weight:     Digital home scale used     - 12/21/22 was 342 lb   - 4/12/23 ~337lb last she remembers  - 5/1/23: Has not been weighing but notices that her clothes are bigger, feels the weight loss  - 6/1/23- notices clothes are fitting better, 332 lbs   - 6/28/23- feels like things are fitting better.  She forgot to weight herself today.   - 7/14/23 - 331 lb (Office visit)  - 10/9/2023 - 324 lb (Office visit)  - 10/25/2023 - 311 (Home scale)  - 1/10/2024 - 303 (Home scale)  - 2/7/2024 -303 (Home scale)  - 4/3/2024 304 (Home scale)  - 5/1/2024  307 (Home scale)  - 5/29/2024 307 (Home scale)    Patient states though she is not seeing a change in her weight on the scale, she is noticing a difference in her appearance. States she has noticed her arms are more toned since going to the gym more often, and is able to wear clothes that previously did not fit. Continues to see this change while not seeing her weight on the scale change, but is interested in alternative pharmacotherapy for further weight loss.    Exercise:   - Gym with her son   - Dog walking   - Tries to 30-40 minutes of exercise 7 days per week     Objective     There were no vitals taken for this visit.    Current Pharmacotherapy:    - Wegovy 2.4 mg: inject 2.4 mg once weekly on Sundays    Adverse Effects:   - Patient states she is experiencing some constipation a few days after her injection, then will have looser stools. Will resolve prior to next dose. Not intolerable per patient. Also states sometimes experiencing bloating, but is unsure if she had been eating fatty or greasy foods prior to this.    Lab Review  Lab Results   Component Value Date    BILITOT 0.5 07/10/2023    CALCIUM 9.6 07/10/2023    CO2 27 07/10/2023     07/10/2023    CREATININE 0.88 07/10/2023    GLUCOSE 92 07/10/2023    ALKPHOS 73 07/10/2023    K 4.5 07/10/2023    PROT 6.7 07/10/2023     07/10/2023    AST 10 07/10/2023    ALT 15 07/10/2023    BUN 12 07/10/2023    ANIONGAP 14 07/10/2023    ALBUMIN 4.0 07/10/2023    GFRF 77 07/10/2023     Lab Results   Component Value Date    TRIG 146 07/10/2023    CHOL 156 07/10/2023    HDL 49.1 07/10/2023     Component      Latest Ref Rng 7/10/2023   LDL      0 - 99 mg/dL 78      Pertinent PMH Review:  PMH of Pancreatitis: No   PMH of Retinopathy: No   PMH of MTC/MEN Type II: No    Drug-drug interactions:  No significant drug interactions that require medication adjustment at this time.     Assessment/Plan   Problem List Items Addressed This Visit       Morbid obesity with BMI  of 45.0-49.9, adult (Multi)    Relevant Medications    tirzepatide, weight loss, (Zepbound) 5 mg/0.5 mL injection    Other Relevant Orders    Follow Up In [facility] - Pharmacy     Patient is doing well on Wegovy, but not seeing further weight loss. Will discontinue Wegovy at this time and initiate Zepbound due to stagnant weight loss and tolerability of previous pharmacotherapy. Will follow up in 3 weeks (prior to 4th dose) to evaluate efficacy and tolerability.    RX changes:    Initiate: Zepbound 5 mg weekly  Discontinue: Wegovy 2.4 mg weekly  All medications are dosed appropriate for renal and hepatic function   Patient education: discussed conversion of Wegovy to Zepbound, differences in potency, expectation of weight loss with new pharmacotherapy.  Compliance at present is estimated to be excellent.   Follow up: I recommend  weight loss follow up be: 3 weeks (prior to 4th dose of Zepbound): 6/19/2024 @ 8:30AM    Meghna Duggan PharmD  PGY-1 Pharmacy Resident, [facility]     Continue all meds under the continuation of care with the referring provider and clinical pharmacy team.

## 2024-05-29 ENCOUNTER — TELEMEDICINE (OUTPATIENT)
Dept: PHARMACY | Facility: HOSPITAL | Age: 59
End: 2024-05-29
Payer: COMMERCIAL

## 2024-05-29 DIAGNOSIS — E66.01 MORBID OBESITY WITH BMI OF 45.0-49.9, ADULT (MULTI): ICD-10-CM

## 2024-05-29 PROCEDURE — RXMED WILLOW AMBULATORY MEDICATION CHARGE

## 2024-05-30 ENCOUNTER — PHARMACY VISIT (OUTPATIENT)
Dept: PHARMACY | Facility: CLINIC | Age: 59
End: 2024-05-30
Payer: COMMERCIAL

## 2024-06-05 DIAGNOSIS — I10 BENIGN ESSENTIAL HYPERTENSION: ICD-10-CM

## 2024-06-05 DIAGNOSIS — E78.5 HYPERLIPIDEMIA, UNSPECIFIED HYPERLIPIDEMIA TYPE: ICD-10-CM

## 2024-06-05 RX ORDER — METOPROLOL SUCCINATE 50 MG/1
50 TABLET, EXTENDED RELEASE ORAL DAILY
Qty: 90 TABLET | Refills: 0 | Status: SHIPPED | OUTPATIENT
Start: 2024-06-05

## 2024-06-05 RX ORDER — LISINOPRIL 20 MG/1
20 TABLET ORAL DAILY
Qty: 90 TABLET | Refills: 0 | Status: SHIPPED | OUTPATIENT
Start: 2024-06-05

## 2024-06-05 RX ORDER — SIMVASTATIN 40 MG/1
40 TABLET, FILM COATED ORAL DAILY
Qty: 90 TABLET | Refills: 0 | Status: SHIPPED | OUTPATIENT
Start: 2024-06-05

## 2024-06-05 NOTE — PROGRESS NOTES
I reviewed the progress note and agree with the resident’s findings and plans as written. Case discussed with resident.    Kvein Waldron, PharmD

## 2024-06-05 NOTE — TELEPHONE ENCOUNTER
Pt called ldm on machine requesting Rx refill on   Lisinopril 20 mg   Metoprolol XL 50 mg   Simvastatin 40 mg   [facility] to [facility]   Last seen 10/09/23  Scheduled 06/28/24     Pharmacy requested a 90 day supply   Pt is completely out of medications

## 2024-06-13 DIAGNOSIS — E78.2 MIXED HYPERLIPIDEMIA: ICD-10-CM

## 2024-06-13 DIAGNOSIS — R79.0 LOW IRON STORES: ICD-10-CM

## 2024-06-13 DIAGNOSIS — I10 BENIGN ESSENTIAL HYPERTENSION: ICD-10-CM

## 2024-06-13 DIAGNOSIS — D64.9 ANEMIA, UNSPECIFIED TYPE: ICD-10-CM

## 2024-06-19 ENCOUNTER — APPOINTMENT (OUTPATIENT)
Dept: PHARMACY | Facility: HOSPITAL | Age: 59
End: 2024-06-19
Payer: COMMERCIAL

## 2024-06-19 DIAGNOSIS — E66.01 MORBID OBESITY WITH BMI OF 45.0-49.9, ADULT (MULTI): ICD-10-CM

## 2024-06-19 NOTE — ASSESSMENT & PLAN NOTE
Patient is doing well from a tolerability standpoint since converting from Wegovy to Zepbound 5 mg, but is not seeing any effect on hunger or weight with this dose. Will increase to 7.5 mg after patient injects her 4th dose of 5 mg. Will follow up in 4 weeks to assess efficacy and tolerability.

## 2024-06-19 NOTE — PROGRESS NOTES
Patient is sent at the request of Yolie Serra MD regarding weight loss.  My final recommendations will be communicated back to the requesting provider by way of shared medical record.     Subjective     Mirtha Faria is a 59 y.o. year old female patient with class III obesity (BMI 46.82 kg/m2) complicated by dyslipidemia and hypertension referred for weight loss management. At previous visit, patient was initiated on Zepbound 5 mg, and discontinued previous Wegovy 2.4 mg therapy.    The patient does have a known family history of diabetes.    Allergies   Allergen Reactions    Hydrochlorothiazide Rash     Diet:   - Previously discussed potential for a dietician visit  - Breakfast: black coffee, Premier protein shake  - Lunch: leftovers (salmon, chicken, meatloaf)  -Dinner: protein, a starch, and a vegetable (meal varies)  -Snacks (evening): Premier protein cereal, protein pancakes    Patient reports doing well on Zepbound 5 mg and has given 3 doses so far (will give 4th dose on Sunday, 6/23/24). Reports that she has not experienced any side effects at this time, but is feeling hungrier than when taking Wegovy.    Weight:     Digital home scale used     - 12/21/22 was 342 lb   - 4/12/23 ~337lb last she remembers  - 5/1/23: Has not been weighing but notices that her clothes are bigger, feels the weight loss  - 6/1/23- notices clothes are fitting better, 332 lbs   - 6/28/23- feels like things are fitting better.  She forgot to weight herself today.   - 7/14/23 - 331 lb (Office visit)  - 10/9/2023 - 324 lb (Office visit)  - 10/25/2023 - 311 (Home scale)  - 1/10/2024 - 303 (Home scale)  - 2/7/2024 -303 (Home scale)  - 4/3/2024 304 (Home scale)  - 5/1/2024 307 (Home scale)  - 5/29/2024 307 (Home scale)  -6/19/2024: no updated weight since starting Zepbound per patient      Exercise:   - Gym with her son   - Dog walking   - Tries to 30-40 minutes of exercise 7 days per week     Objective     There were no vitals taken  for this visit.    Current Pharmacotherapy:    - Zepbound 5 mg weekly on Sundays    Historical Pharmacotherapy:  - Wegovy 2.4 mg: inject 2.4 mg once weekly on Sundays    Adverse Effects:   - none per patient    Lab Review  Lab Results   Component Value Date    BILITOT 0.5 07/10/2023    CALCIUM 9.6 07/10/2023    CO2 27 07/10/2023     07/10/2023    CREATININE 0.88 07/10/2023    GLUCOSE 92 07/10/2023    ALKPHOS 73 07/10/2023    K 4.5 07/10/2023    PROT 6.7 07/10/2023     07/10/2023    AST 10 07/10/2023    ALT 15 07/10/2023    BUN 12 07/10/2023    ANIONGAP 14 07/10/2023    ALBUMIN 4.0 07/10/2023    GFRF 77 07/10/2023     Lab Results   Component Value Date    TRIG 146 07/10/2023    CHOL 156 07/10/2023    HDL 49.1 07/10/2023     Component      Latest Ref Rng 7/10/2023   LDL      0 - 99 mg/dL 78      Pertinent PMH Review:  PMH of Pancreatitis: No   PMH of Retinopathy: No   PMH of MTC/MEN Type II: No    Drug-drug interactions:  No significant drug interactions that require medication adjustment at this time.     Assessment/Plan   Problem List Items Addressed This Visit       Morbid obesity with BMI of 45.0-49.9, adult (Multi)     Patient is doing well from a tolerability standpoint since converting from Wegovy to Zepbound 5 mg, but is not seeing any effect on hunger or weight with this dose. Will increase to 7.5 mg after patient injects her 4th dose of 5 mg. Will follow up in 4 weeks to assess efficacy and tolerability.         Relevant Medications    tirzepatide, weight loss, (Zepbound) 7.5 mg/0.5 mL injection    Other Relevant Orders    Follow Up In [facility]       RX changes:    Increase: Zepbound to 7.5 mg. Patient to give 4th dose of Zepbound 5 mg on Sunday, 6/23, then will increase to 7.5 mg weekly starting 6/30/2024.  All medications are dosed appropriate for renal and hepatic function   Patient education: discussed conversion of Wegovy to Zepbound, differences in potency,  expectation of weight loss with new pharmacotherapy.  Compliance at present is estimated to be excellent.   Follow up: I recommend  weight loss follow up be: 1 month: 7/17/2024 @ 9:00 AM    Meghna Duggan PharmD  PGY-1 Pharmacy Resident, [facility]     Continue all meds under the continuation of care with the referring provider and clinical pharmacy team.

## 2024-06-24 PROCEDURE — RXMED WILLOW AMBULATORY MEDICATION CHARGE

## 2024-06-26 ENCOUNTER — PHARMACY VISIT (OUTPATIENT)
Dept: PHARMACY | Facility: CLINIC | Age: 59
End: 2024-06-26
Payer: COMMERCIAL

## 2024-06-26 ENCOUNTER — LAB (OUTPATIENT)
Dept: LAB | Facility: LAB | Age: 59
End: 2024-06-26
Payer: COMMERCIAL

## 2024-06-26 DIAGNOSIS — R79.0 LOW IRON STORES: ICD-10-CM

## 2024-06-26 DIAGNOSIS — I10 BENIGN ESSENTIAL HYPERTENSION: ICD-10-CM

## 2024-06-26 DIAGNOSIS — Z00.00 ROUTINE GENERAL MEDICAL EXAMINATION AT A HEALTH CARE FACILITY: ICD-10-CM

## 2024-06-26 DIAGNOSIS — D64.9 ANEMIA, UNSPECIFIED TYPE: ICD-10-CM

## 2024-06-26 DIAGNOSIS — E78.2 MIXED HYPERLIPIDEMIA: ICD-10-CM

## 2024-06-26 LAB
ALBUMIN SERPL BCP-MCNC: 4.3 G/DL (ref 3.4–5)
ALP SERPL-CCNC: 78 U/L (ref 33–110)
ALT SERPL W P-5'-P-CCNC: 15 U/L (ref 7–45)
ANION GAP SERPL CALC-SCNC: 14 MMOL/L (ref 10–20)
AST SERPL W P-5'-P-CCNC: 12 U/L (ref 9–39)
BASOPHILS # BLD AUTO: 0.03 X10*3/UL (ref 0–0.1)
BASOPHILS NFR BLD AUTO: 0.4 %
BILIRUB SERPL-MCNC: 0.4 MG/DL (ref 0–1.2)
BUN SERPL-MCNC: 21 MG/DL (ref 6–23)
CALCIUM SERPL-MCNC: 9.4 MG/DL (ref 8.6–10.6)
CHLORIDE SERPL-SCNC: 105 MMOL/L (ref 98–107)
CHOLEST SERPL-MCNC: 183 MG/DL (ref 0–199)
CHOLESTEROL/HDL RATIO: 3.8
CO2 SERPL-SCNC: 28 MMOL/L (ref 21–32)
CREAT SERPL-MCNC: 0.89 MG/DL (ref 0.5–1.05)
EGFRCR SERPLBLD CKD-EPI 2021: 76 ML/MIN/1.73M*2
EOSINOPHIL # BLD AUTO: 0.45 X10*3/UL (ref 0–0.7)
EOSINOPHIL NFR BLD AUTO: 6.5 %
ERYTHROCYTE [DISTWIDTH] IN BLOOD BY AUTOMATED COUNT: 15.6 % (ref 11.5–14.5)
EST. AVERAGE GLUCOSE BLD GHB EST-MCNC: 105 MG/DL
FERRITIN SERPL-MCNC: 33 NG/ML (ref 8–150)
GLUCOSE SERPL-MCNC: 94 MG/DL (ref 74–99)
HBA1C MFR BLD: 5.3 %
HCT VFR BLD AUTO: 42.3 % (ref 36–46)
HDLC SERPL-MCNC: 47.6 MG/DL
HGB BLD-MCNC: 13.5 G/DL (ref 12–16)
IMM GRANULOCYTES # BLD AUTO: 0.04 X10*3/UL (ref 0–0.7)
IMM GRANULOCYTES NFR BLD AUTO: 0.6 % (ref 0–0.9)
IRON SATN MFR SERPL: 16 % (ref 25–45)
IRON SERPL-MCNC: 62 UG/DL (ref 35–150)
LDLC SERPL CALC-MCNC: 106 MG/DL
LYMPHOCYTES # BLD AUTO: 1.41 X10*3/UL (ref 1.2–4.8)
LYMPHOCYTES NFR BLD AUTO: 20.3 %
MCH RBC QN AUTO: 29.5 PG (ref 26–34)
MCHC RBC AUTO-ENTMCNC: 31.9 G/DL (ref 32–36)
MCV RBC AUTO: 92 FL (ref 80–100)
MONOCYTES # BLD AUTO: 0.56 X10*3/UL (ref 0.1–1)
MONOCYTES NFR BLD AUTO: 8 %
NEUTROPHILS # BLD AUTO: 4.47 X10*3/UL (ref 1.2–7.7)
NEUTROPHILS NFR BLD AUTO: 64.2 %
NON HDL CHOLESTEROL: 135 MG/DL (ref 0–149)
NRBC BLD-RTO: 0 /100 WBCS (ref 0–0)
PLATELET # BLD AUTO: 326 X10*3/UL (ref 150–450)
POTASSIUM SERPL-SCNC: 4.1 MMOL/L (ref 3.5–5.3)
PROT SERPL-MCNC: 6.9 G/DL (ref 6.4–8.2)
RBC # BLD AUTO: 4.58 X10*6/UL (ref 4–5.2)
SODIUM SERPL-SCNC: 143 MMOL/L (ref 136–145)
TIBC SERPL-MCNC: 391 UG/DL (ref 240–445)
TRIGL SERPL-MCNC: 146 MG/DL (ref 0–149)
TSH SERPL-ACNC: 2.11 MIU/L (ref 0.44–3.98)
UIBC SERPL-MCNC: 329 UG/DL (ref 110–370)
VLDL: 29 MG/DL (ref 0–40)
WBC # BLD AUTO: 7 X10*3/UL (ref 4.4–11.3)

## 2024-06-26 PROCEDURE — 80053 COMPREHEN METABOLIC PANEL: CPT

## 2024-06-26 PROCEDURE — 84443 ASSAY THYROID STIM HORMONE: CPT

## 2024-06-26 PROCEDURE — 83036 HEMOGLOBIN GLYCOSYLATED A1C: CPT

## 2024-06-26 PROCEDURE — 83550 IRON BINDING TEST: CPT

## 2024-06-26 PROCEDURE — 80061 LIPID PANEL: CPT

## 2024-06-26 PROCEDURE — 82728 ASSAY OF FERRITIN: CPT

## 2024-06-26 PROCEDURE — 83540 ASSAY OF IRON: CPT

## 2024-06-26 PROCEDURE — 36415 COLL VENOUS BLD VENIPUNCTURE: CPT

## 2024-06-26 PROCEDURE — 85025 COMPLETE CBC W/AUTO DIFF WBC: CPT

## 2024-06-28 ENCOUNTER — APPOINTMENT (OUTPATIENT)
Dept: PRIMARY CARE | Facility: CLINIC | Age: 59
End: 2024-06-28
Payer: COMMERCIAL

## 2024-06-28 VITALS
HEIGHT: 70 IN | TEMPERATURE: 98.2 F | BODY MASS INDEX: 41.95 KG/M2 | DIASTOLIC BLOOD PRESSURE: 76 MMHG | WEIGHT: 293 LBS | SYSTOLIC BLOOD PRESSURE: 118 MMHG | OXYGEN SATURATION: 97 % | HEART RATE: 90 BPM

## 2024-06-28 DIAGNOSIS — E66.01 MORBID OBESITY WITH BMI OF 45.0-49.9, ADULT (MULTI): ICD-10-CM

## 2024-06-28 DIAGNOSIS — E78.5 HYPERLIPIDEMIA, UNSPECIFIED HYPERLIPIDEMIA TYPE: ICD-10-CM

## 2024-06-28 DIAGNOSIS — I10 BENIGN ESSENTIAL HYPERTENSION: ICD-10-CM

## 2024-06-28 DIAGNOSIS — Z00.00 ROUTINE GENERAL MEDICAL EXAMINATION AT A HEALTH CARE FACILITY: ICD-10-CM

## 2024-06-28 DIAGNOSIS — R79.0 LOW IRON STORES: Primary | ICD-10-CM

## 2024-06-28 RX ORDER — METOPROLOL SUCCINATE 50 MG/1
50 TABLET, EXTENDED RELEASE ORAL DAILY
Qty: 90 TABLET | Refills: 0 | Status: SHIPPED | OUTPATIENT
Start: 2024-06-28 | End: 2024-06-28

## 2024-06-28 RX ORDER — LISINOPRIL 20 MG/1
20 TABLET ORAL DAILY
Qty: 90 TABLET | Refills: 1 | Status: SHIPPED | OUTPATIENT
Start: 2024-06-28

## 2024-06-28 RX ORDER — METOPROLOL SUCCINATE 50 MG/1
50 TABLET, EXTENDED RELEASE ORAL DAILY
Qty: 90 TABLET | Refills: 1 | Status: SHIPPED | OUTPATIENT
Start: 2024-06-28

## 2024-06-28 RX ORDER — SIMVASTATIN 40 MG/1
40 TABLET, FILM COATED ORAL DAILY
Qty: 90 TABLET | Refills: 1 | Status: SHIPPED | OUTPATIENT
Start: 2024-06-28

## 2024-06-28 RX ORDER — LISINOPRIL 20 MG/1
20 TABLET ORAL DAILY
Qty: 90 TABLET | Refills: 0 | Status: SHIPPED | OUTPATIENT
Start: 2024-06-28 | End: 2024-06-28

## 2024-06-28 RX ORDER — SIMVASTATIN 40 MG/1
40 TABLET, FILM COATED ORAL DAILY
Qty: 90 TABLET | Refills: 0 | Status: SHIPPED | OUTPATIENT
Start: 2024-06-28 | End: 2024-06-28

## 2024-06-28 ASSESSMENT — PATIENT HEALTH QUESTIONNAIRE - PHQ9
1. LITTLE INTEREST OR PLEASURE IN DOING THINGS: NOT AT ALL
2. FEELING DOWN, DEPRESSED OR HOPELESS: NOT AT ALL
SUM OF ALL RESPONSES TO PHQ9 QUESTIONS 1 AND 2: 0

## 2024-06-28 ASSESSMENT — ENCOUNTER SYMPTOMS
DEPRESSION: 0
LOSS OF SENSATION IN FEET: 0
OCCASIONAL FEELINGS OF UNSTEADINESS: 0

## 2024-06-28 NOTE — PROGRESS NOTES
"Subjective   Patient ID: Mirtha Faria is a 59 y.o. female who presents for Follow-up (EP.  Follow up.  Labs done.  No concerns.).  HPI  Here for fu feels  well she has an onset on for about 5 weeks being managed by pharmacy for weight management.  She continues on her blood pressure medications.  She is feeling well.  She continues on her cholesterol medication.  We have blood work done which we reviewed today in the office including CMP lipid panel thyroid A1c and she also had iron testing done.  She does not take her iron consistently and could certainly increase the frequency.  She had a colonoscopy in December 2023 and the GI doctor had suggested an EGD which she has not done yet we discussed that today that that might not be a bad idea to do so she is can call and schedule that and also try to increase her iron frequency.  She had terribly heavy periods prior to stopping and she had a hysterectomy about 4 years ago and she believes she would still be having periods now she had had that done.  She feels like she just never really caught up on her iron stores.  She had a lot of sugars out of her diet her blood work is good and she is working on exercise    Review of Systems  Review of systems was performed and is otherwise negative except as noted in HPI.  Objective   /76   Pulse 90   Temp 36.8 °C (98.2 °F) (Oral)   Ht 1.778 m (5' 10\")   Wt 142 kg (313 lb)   SpO2 97%   BMI 44.91 kg/m²    Physical Exam  HEENT is normal  Lungs clear bilaterally  Heart is regular rate rhythm no murmurs  Abdomen benign  Lower extremities no edema    Assessment/Plan   Diagnoses and all orders for this visit:  Low iron stores  Benign essential hypertension  -     lisinopril 20 mg tablet; Take 1 tablet (20 mg) by mouth once daily.  -     metoprolol succinate XL (Toprol-XL) 50 mg 24 hr tablet; Take 1 tablet (50 mg) by mouth once daily.  Hyperlipidemia, unspecified hyperlipidemia type  -     simvastatin (Zocor) 40 mg tablet; " Take 1 tablet (40 mg) by mouth once daily.  Morbid obesity with BMI of 45.0-49.9, adult (Multi)    Patient will resume her iron  She will call with issues  Follow-up in 6 months for physical  Will retest iron at that time  She is going to set up for endoscopy    Yolie Serra MD

## 2024-07-17 ENCOUNTER — APPOINTMENT (OUTPATIENT)
Dept: PHARMACY | Facility: HOSPITAL | Age: 59
End: 2024-07-17
Payer: COMMERCIAL

## 2024-07-17 DIAGNOSIS — E66.01 MORBID OBESITY WITH BMI OF 45.0-49.9, ADULT (MULTI): ICD-10-CM

## 2024-07-17 PROCEDURE — RXMED WILLOW AMBULATORY MEDICATION CHARGE

## 2024-07-17 NOTE — PROGRESS NOTES
Patient is sent at the request of Yolie Serra MD regarding weight loss.  My final recommendations will be communicated back to the requesting provider by way of shared medical record.     Subjective     Mirtha Faria is a 59 y.o. year old female patient with class III obesity (BMI 46.82 kg/m2) complicated by dyslipidemia and hypertension referred for weight loss management. Patient was previously on Wegovy but changed to Zepbound when she began gaining weight on the highest dose of Wegovy.     Patient denies side effects with Zepbound. Notes one instance of injection error. Endorses more frequent bowel movements. Notes that she has not noticed as much appetite suppression as higher doses of Wegovy.     The patient does have a known family history of diabetes.    Allergies   Allergen Reactions    Hydrochlorothiazide Rash     Diet:   - Previously discussed potential for a dietician visit  - Breakfast: black coffee, Premier protein shake  - Lunch: leftovers (salmon, chicken, meatloaf)  -Dinner: protein, a starch, and a vegetable (meal varies)  -Snacks (evening): Premier protein cereal, protein pancakes    Weight:     Digital home scale used     - 12/21/22 was 342 lb   - 4/12/23 ~337lb last she remembers  - 5/1/23: Has not been weighing but notices that her clothes are bigger, feels the weight loss  - 6/1/23- notices clothes are fitting better, 332 lbs   - 6/28/23- feels like things are fitting better.  She forgot to weight herself today.   - 7/14/23 - 331 lb (Office visit)  - 10/9/2023 - 324 lb (Office visit)  - 10/25/2023 - 311 (Home scale)  - 1/10/2024 - 303 (Home scale)  - 2/7/2024 -303 (Home scale)  - 4/3/2024 304 (Home scale)  - 5/1/2024 307 (Home scale)  - 5/29/2024 307 (Home scale)  -6/19/2024: no updated weight since starting Zepbound per patient  - 7/17/2024: didn't weight herself      Exercise:   - Gym with her son   - Dog walking   - Tries to 30-40 minutes of exercise 7 days per week     Objective      There were no vitals taken for this visit.    Current Pharmacotherapy:    - Zepbound 7.5 mg weekly on Sundays    Historical Pharmacotherapy:  - Wegovy 2.4 mg: inject 2.4 mg once weekly on Sundays    Adverse Effects:   - none per patient    Lab Review  Lab Results   Component Value Date    BILITOT 0.4 06/26/2024    CALCIUM 9.4 06/26/2024    CO2 28 06/26/2024     06/26/2024    CREATININE 0.89 06/26/2024    GLUCOSE 94 06/26/2024    ALKPHOS 78 06/26/2024    K 4.1 06/26/2024    PROT 6.9 06/26/2024     06/26/2024    AST 12 06/26/2024    ALT 15 06/26/2024    BUN 21 06/26/2024    ANIONGAP 14 06/26/2024    ALBUMIN 4.3 06/26/2024    GFRF 77 07/10/2023     Lab Results   Component Value Date    TRIG 146 06/26/2024    CHOL 183 06/26/2024    LDLCALC 106 (H) 06/26/2024    HDL 47.6 06/26/2024     Component      Latest Ref Rng 7/10/2023   LDL      0 - 99 mg/dL 78      Pertinent PMH Review:  PMH of Pancreatitis: No   PMH of Retinopathy: No   PMH of MTC/MEN Type II: No    Drug-drug interactions:  No significant drug interactions that require medication adjustment at this time.     Assessment/Plan   Problem List Items Addressed This Visit       Morbid obesity with BMI of 45.0-49.9, adult (Multi)    Relevant Medications    tirzepatide, weight loss, (Zepbound) 10 mg/0.5 mL injection    Other Relevant Orders    Follow Up In [facility]acy     RX changes:    Increase: Zepbound to 10 mg  All medications are dosed appropriate for renal and hepatic function   Compliance at present is estimated to be excellent.   Follow up: 8/14/2024 at 9 am via phone    Kevin Waldron, PharmD, BCACP  [phone]     Continue all meds under the continuation of care with the referring provider and clinical pharmacy team.

## 2024-07-23 ENCOUNTER — PHARMACY VISIT (OUTPATIENT)
Dept: PHARMACY | Facility: CLINIC | Age: 59
End: 2024-07-23
Payer: COMMERCIAL

## 2024-08-14 ENCOUNTER — APPOINTMENT (OUTPATIENT)
Dept: PHARMACY | Facility: HOSPITAL | Age: 59
End: 2024-08-14
Payer: COMMERCIAL

## 2024-08-14 DIAGNOSIS — E66.01 MORBID OBESITY WITH BMI OF 45.0-49.9, ADULT (MULTI): ICD-10-CM

## 2024-08-14 PROCEDURE — RXMED WILLOW AMBULATORY MEDICATION CHARGE

## 2024-08-14 NOTE — PROGRESS NOTES
Patient is sent at the request of Yolie Serra MD regarding weight loss.  My final recommendations will be communicated back to the requesting provider by way of shared medical record.     Subjective     Mirtha Faria is a 59 y.o. year old female patient with class III obesity (BMI 46.82 kg/m2) complicated by dyslipidemia and hypertension referred for weight loss management. Patient was previously on Wegovy but changed to Zepbound when she began gaining weight on the highest dose of Wegovy.     Reports she has had a lot going on in life that has limited her exercise and dieting. Has not been to the gym for ~2 months. Was told ~2 months ago that she may lose her job. Endorses that her Bms are regular. Notes irritation at the injection site, bruising with some itching. Otherwise, is pleased with therapy and denies other side effects.     The patient does have a known family history of diabetes.    Allergies   Allergen Reactions    Hydrochlorothiazide Rash     Diet:   - Previously discussed potential for a dietician visit  - Breakfast: black coffee, Premier protein shake  - Lunch: leftovers (salmon, chicken, meatloaf)  -Dinner: protein, a starch, and a vegetable (meal varies)  -Snacks (evening): Premier protein cereal, protein pancakes    Weight:     Digital home scale used     - 12/21/22 was 342 lb   - 4/12/23 ~337lb last she remembers  - 5/1/23: Has not been weighing but notices that her clothes are bigger, feels the weight loss  - 6/1/23- notices clothes are fitting better, 332 lbs   - 6/28/23- feels like things are fitting better.  She forgot to weight herself today.   - 7/14/23 - 331 lb (Office visit)  - 10/9/2023 - 324 lb (Office visit)  - 10/25/2023 - 311 (Home scale)  - 1/10/2024 - 303 (Home scale)  - 2/7/2024 -303 (Home scale)  - 4/3/2024 304 (Home scale)  - 5/1/2024 307 (Home scale)  - 5/29/2024 307 (Home scale)  -6/19/2024: no updated weight since starting Zepbound per patient  - 7/17/2024: didn't  weight herself  -8/14/2024: 307 lbs      Exercise:   - Gym with her son   - Dog walking   - Tries to 30-40 minutes of exercise 7 days per week     Objective     There were no vitals taken for this visit.    Current Pharmacotherapy:    - Zepbound 10 mg weekly on Sundays    Historical Pharmacotherapy:  - Wegovy 2.4 mg: inject 2.4 mg once weekly on Sundays    Adverse Effects:   - none per patient    Lab Review  Lab Results   Component Value Date    BILITOT 0.4 06/26/2024    CALCIUM 9.4 06/26/2024    CO2 28 06/26/2024     06/26/2024    CREATININE 0.89 06/26/2024    GLUCOSE 94 06/26/2024    ALKPHOS 78 06/26/2024    K 4.1 06/26/2024    PROT 6.9 06/26/2024     06/26/2024    AST 12 06/26/2024    ALT 15 06/26/2024    BUN 21 06/26/2024    ANIONGAP 14 06/26/2024    ALBUMIN 4.3 06/26/2024    GFRF 77 07/10/2023     Lab Results   Component Value Date    TRIG 146 06/26/2024    CHOL 183 06/26/2024    LDLCALC 106 (H) 06/26/2024    HDL 47.6 06/26/2024     Component      Latest Ref Rng 7/10/2023   LDL      0 - 99 mg/dL 78      Pertinent PMH Review:  PMH of Pancreatitis: No   PMH of Retinopathy: No   PMH of MTC/MEN Type II: No    Drug-drug interactions:  No significant drug interactions that require medication adjustment at this time.     Assessment/Plan   Problem List Items Addressed This Visit       Morbid obesity with BMI of 45.0-49.9, adult (Multi)   Weight loss: patient is a good candidate for weight loss given BMI >30 kg/m2. Patient is motivated to recommit to weight loss and get back to dieting and exercise. Will increase dose to assist with weight loss as patient has not lost weight for a few months.   RX changes:    Increase: Zepbound to 12.5 mg  All medications are dosed appropriate for renal and hepatic function   Compliance at present is estimated to be excellent.   Follow up: 9/11/2024 at 9 am via phone    Kevin Waldron, PharmD, BCACP  [phone]     Continue all meds under the continuation of care with the  referring provider and clinical pharmacy team.

## 2024-08-15 ENCOUNTER — PHARMACY VISIT (OUTPATIENT)
Dept: PHARMACY | Facility: CLINIC | Age: 59
End: 2024-08-15
Payer: COMMERCIAL

## 2024-09-11 ENCOUNTER — APPOINTMENT (OUTPATIENT)
Dept: PHARMACY | Facility: HOSPITAL | Age: 59
End: 2024-09-11
Payer: COMMERCIAL

## 2024-09-11 DIAGNOSIS — E66.01 MORBID OBESITY WITH BMI OF 45.0-49.9, ADULT (MULTI): ICD-10-CM

## 2024-09-11 PROCEDURE — RXMED WILLOW AMBULATORY MEDICATION CHARGE

## 2024-09-11 NOTE — PROGRESS NOTES
Patient is sent at the request of Yolie Serra MD regarding weight loss.  My final recommendations will be communicated back to the requesting provider by way of shared medical record.     Subjective     Mirtha Faria is a 59 y.o. year old female patient with class III obesity (BMI 46.82 kg/m2) complicated by dyslipidemia and hypertension referred for weight loss management. Patient was previously on Wegovy but changed to Zepbound when she began gaining weight on the highest dose of Wegovy.     Patient reports she lost her job last week and is now focused on finding a new job at the moment. She thought she would go to the gym more during this time but she has been focusing on her mental health instead.    Reports some injection site pain and itching that last ~1 day, otherwise no reported side effects.       The patient does have a known family history of diabetes.    Allergies   Allergen Reactions    Hydrochlorothiazide Rash     Diet:   - Previously discussed potential for a dietician visit  - Breakfast: black coffee, Premier protein shake  - Lunch: leftovers (salmon, chicken, meatloaf)  -Dinner: protein, a starch, and a vegetable (meal varies)  -Snacks (evening): Premier protein cereal, protein pancakes    Weight:     Digital home scale used     - 12/21/22 was 342 lb   - 4/12/23 ~337lb last she remembers  - 5/1/23: Has not been weighing but notices that her clothes are bigger, feels the weight loss  - 6/1/23- notices clothes are fitting better, 332 lbs   - 6/28/23- feels like things are fitting better.  She forgot to weight herself today.   - 7/14/23 - 331 lb (Office visit)  - 10/9/2023 - 324 lb (Office visit)  - 10/25/2023 - 311 (Home scale)  - 1/10/2024 - 303 (Home scale)  - 2/7/2024 -303 (Home scale)  - 4/3/2024 304 (Home scale)  - 5/1/2024 307 (Home scale)  - 5/29/2024 307 (Home scale)  -6/19/2024: no updated weight since starting Zepbound per patient  - 7/17/2024: didn't weight herself  -8/14/2024: 307  lbs  -9/11/2024: 305 lbs    Exercise:   - Gym with her son   - Dog walking   - Tries to 30-40 minutes of exercise 7 days per week     Objective     There were no vitals taken for this visit.    Current Pharmacotherapy:    - Zepbound 12.5 mg weekly    Historical Pharmacotherapy:  - Wegovy 2.4 mg: inject 2.4 mg once weekly on Sundays    Adverse Effects:   - none per patient    Lab Review  Lab Results   Component Value Date    BILITOT 0.4 06/26/2024    CALCIUM 9.4 06/26/2024    CO2 28 06/26/2024     06/26/2024    CREATININE 0.89 06/26/2024    GLUCOSE 94 06/26/2024    ALKPHOS 78 06/26/2024    K 4.1 06/26/2024    PROT 6.9 06/26/2024     06/26/2024    AST 12 06/26/2024    ALT 15 06/26/2024    BUN 21 06/26/2024    ANIONGAP 14 06/26/2024    ALBUMIN 4.3 06/26/2024    GFRF 77 07/10/2023     Lab Results   Component Value Date    TRIG 146 06/26/2024    CHOL 183 06/26/2024    LDLCALC 106 (H) 06/26/2024    HDL 47.6 06/26/2024     Component      Latest Ref Rng 7/10/2023   LDL      0 - 99 mg/dL 78      Pertinent PMH Review:  PMH of Pancreatitis: No   PMH of Retinopathy: No   PMH of MTC/MEN Type II: No    Drug-drug interactions:  No significant drug interactions that require medication adjustment at this time.     Assessment/Plan   Problem List Items Addressed This Visit       Morbid obesity with BMI of 45.0-49.9, adult (Multi)    Relevant Medications    tirzepatide, weight loss, (Zepbound) 15 mg/0.5 mL injection    Other Relevant Orders    Follow Up In [facility] Pharmacy   Weight loss: patient is a good candidate for weight loss given BMI >30 kg/m2. Given slow weight loss over the past month, will again increase dose to hopefully stimulate more appetite suppression.   RX changes:    Increase: Zepbound to 15 mg  All medications are dosed appropriate for renal and hepatic function   Compliance at present is estimated to be excellent.   Follow up: 10/9/2024 at 9:30 am via phone    Kevin Waldron, CarlosD,  [facility]  [phone]     Continue all meds under the continuation of care with the referring provider and clinical pharmacy team.

## 2024-09-12 ENCOUNTER — PHARMACY VISIT (OUTPATIENT)
Dept: PHARMACY | Facility: CLINIC | Age: 59
End: 2024-09-12
Payer: COMMERCIAL

## 2024-10-09 ENCOUNTER — APPOINTMENT (OUTPATIENT)
Dept: PHARMACY | Facility: HOSPITAL | Age: 59
End: 2024-10-09
Payer: COMMERCIAL

## 2024-10-09 DIAGNOSIS — E66.01 MORBID OBESITY WITH BMI OF 45.0-49.9, ADULT (MULTI): ICD-10-CM

## 2024-10-09 NOTE — PROGRESS NOTES
Patient is sent at the request of Yolie Serra MD regarding weight loss.  My final recommendations will be communicated back to the requesting provider by way of shared medical record.     Subjective     Mirtha Faria is a 59 y.o. year old female patient with class III obesity (BMI 46.82 kg/m2) complicated by dyslipidemia and hypertension referred for weight loss management. Patient was previously on Wegovy but changed to Zepbound when she began gaining weight on the highest dose of Wegovy.     Patient endorses getting an upset stomach last week, otherwise denies side effects. Got a voucher for a free box of Zepbound d/t failed injector but has not received yet. Patient working to get a new job.     Patient is now on [employer] insurance.     Reports some injection site pain and itching that last ~1 day, otherwise no reported side effects.       The patient does have a known family history of diabetes.    Allergies   Allergen Reactions    Hydrochlorothiazide Rash     Diet:   - Previously discussed potential for a dietician visit  - Breakfast: black coffee, Premier protein shake  - Lunch: leftovers (salmon, chicken, meatloaf)  -Dinner: protein, a starch, and a vegetable (meal varies)  -Snacks (evening): Premier protein cereal, protein pancakes    Weight:     Digital home scale used     - 12/21/22 was 342 lb   - 4/12/23 ~337lb last she remembers  - 5/1/23: Has not been weighing but notices that her clothes are bigger, feels the weight loss  - 6/1/23- notices clothes are fitting better, 332 lbs   - 6/28/23- feels like things are fitting better.  She forgot to weight herself today.   - 7/14/23 - 331 lb (Office visit)  - 10/9/2023 - 324 lb (Office visit)  - 10/25/2023 - 311 (Home scale)  - 1/10/2024 - 303 (Home scale)  - 2/7/2024 -303 (Home scale)  - 4/3/2024 304 (Home scale)  - 5/1/2024 307 (Home scale)  - 5/29/2024 307 (Home scale)  -6/19/2024: no updated weight since starting Zepbound per patient  - 7/17/2024: didn't  weight herself  -8/14/2024: 307 lbs  -9/11/2024: 305 lbs  -10/9/2024: 304 lbs    Exercise:   - Gym with her son   - Dog walking   - Tries to 30-40 minutes of exercise 7 days per week     Objective     There were no vitals taken for this visit.    Current Pharmacotherapy:    - Zepbound 15 mg weekly    Historical Pharmacotherapy:  - Wegovy 2.4 mg: inject 2.4 mg once weekly on Sundays    Adverse Effects:   - none per patient    Lab Review  Lab Results   Component Value Date    BILITOT 0.4 06/26/2024    CALCIUM 9.4 06/26/2024    CO2 28 06/26/2024     06/26/2024    CREATININE 0.89 06/26/2024    GLUCOSE 94 06/26/2024    ALKPHOS 78 06/26/2024    K 4.1 06/26/2024    PROT 6.9 06/26/2024     06/26/2024    AST 12 06/26/2024    ALT 15 06/26/2024    BUN 21 06/26/2024    ANIONGAP 14 06/26/2024    ALBUMIN 4.3 06/26/2024    GFRF 77 07/10/2023     Lab Results   Component Value Date    TRIG 146 06/26/2024    CHOL 183 06/26/2024    LDLCALC 106 (H) 06/26/2024    HDL 47.6 06/26/2024     Component      Latest Ref Rng 7/10/2023   LDL      0 - 99 mg/dL 78      Pertinent PMH Review:  PMH of Pancreatitis: No   PMH of Retinopathy: No   PMH of MTC/MEN Type II: No    Drug-drug interactions:  No significant drug interactions that require medication adjustment at this time.     Assessment/Plan   Problem List Items Addressed This Visit       Morbid obesity with BMI of 45.0-49.9, adult (Multi)   Weight loss: patient is a good candidate for weight loss given BMI >30 kg/m2. Due to patient's change in insurance, will continue current therapy and utilize voucher patient is to receive to continue for an additional month now that the patient lost her employee insurance.    RX changes:    Increase: Zepbound to 15 mg  All medications are dosed appropriate for renal and hepatic function   Compliance at present is estimated to be excellent.   Follow up: 11/6/2024 at 9:30 am via phone    Kevin Waldron, PharmD, BCACP  [phone]     Continue all  meds under the continuation of care with the referring provider and clinical pharmacy team.

## 2024-10-11 PROCEDURE — RXMED WILLOW AMBULATORY MEDICATION CHARGE

## 2024-10-14 ENCOUNTER — PHARMACY VISIT (OUTPATIENT)
Dept: PHARMACY | Facility: CLINIC | Age: 59
End: 2024-10-14
Payer: COMMERCIAL

## 2024-11-06 ENCOUNTER — APPOINTMENT (OUTPATIENT)
Dept: PHARMACY | Facility: HOSPITAL | Age: 59
End: 2024-11-06
Payer: COMMERCIAL

## 2024-11-06 DIAGNOSIS — E66.01 MORBID OBESITY WITH BMI OF 45.0-49.9, ADULT (MULTI): ICD-10-CM

## 2024-11-06 PROCEDURE — RXMED WILLOW AMBULATORY MEDICATION CHARGE

## 2024-11-06 NOTE — PROGRESS NOTES
Patient is sent at the request of Yolie Serra MD regarding weight loss.  My final recommendations will be communicated back to the requesting provider by way of shared medical record.     Subjective     Mirtha Faria is a 59 y.o. year old female patient with class III obesity (BMI 46.82 kg/m2) complicated by dyslipidemia and hypertension referred for weight loss management. Patient was previously on Wegovy but changed to Zepbound when she began gaining weight on the highest dose of Wegovy.     Patient endorses two instances of stomach upset. Resolved after taking some Pepto Bismol. Using Zepbound 12.5 mg dose right now that she got as a replacement for a failed injector.     Patient is now on [employer] insurance.     Reports some injection site pain and itching that last ~1 day, otherwise no reported side effects.       The patient does have a known family history of diabetes.    Allergies   Allergen Reactions    Hydrochlorothiazide Rash     Diet:   - Previously discussed potential for a dietician visit  - Breakfast: black coffee, Premier protein shake  - Lunch: leftovers (salmon, chicken, meatloaf)  -Dinner: protein, a starch, and a vegetable (meal varies)  -Snacks (evening): Premier protein cereal, protein pancakes    Weight:     Digital home scale used     - 12/21/22 was 342 lb   - 4/12/23 ~337lb last she remembers  - 5/1/23: Has not been weighing but notices that her clothes are bigger, feels the weight loss  - 6/1/23- notices clothes are fitting better, 332 lbs   - 6/28/23- feels like things are fitting better.  She forgot to weight herself today.   - 7/14/23 - 331 lb (Office visit)  - 10/9/2023 - 324 lb (Office visit)  - 10/25/2023 - 311 (Home scale)  - 1/10/2024 - 303 (Home scale)  - 2/7/2024 -303 (Home scale)  - 4/3/2024 304 (Home scale)  - 5/1/2024 307 (Home scale)  - 5/29/2024 307 (Home scale)  -6/19/2024: no updated weight since starting Zepbound per patient  - 7/17/2024: didn't weight  herself  -8/14/2024: 307 lbs  -9/11/2024: 305 lbs  -10/9/2024: 304 lbs  -11/6/2024: 302-303 lbs    Exercise:   - Gym with her son   - Dog walking   - Tries to 30-40 minutes of exercise 7 days per week     Objective     There were no vitals taken for this visit.    Current Pharmacotherapy:    - Zepbound 15 mg weekly    Historical Pharmacotherapy:  - Wegovy 2.4 mg: inject 2.4 mg once weekly on Sundays    Adverse Effects:   - none per patient    Lab Review  Lab Results   Component Value Date    BILITOT 0.4 06/26/2024    CALCIUM 9.4 06/26/2024    CO2 28 06/26/2024     06/26/2024    CREATININE 0.89 06/26/2024    GLUCOSE 94 06/26/2024    ALKPHOS 78 06/26/2024    K 4.1 06/26/2024    PROT 6.9 06/26/2024     06/26/2024    AST 12 06/26/2024    ALT 15 06/26/2024    BUN 21 06/26/2024    ANIONGAP 14 06/26/2024    ALBUMIN 4.3 06/26/2024    GFRF 77 07/10/2023     Lab Results   Component Value Date    TRIG 146 06/26/2024    CHOL 183 06/26/2024    LDLCALC 106 (H) 06/26/2024    HDL 47.6 06/26/2024     Component      Latest Ref Rng 7/10/2023   LDL      0 - 99 mg/dL 78      Pertinent PMH Review:  PMH of Pancreatitis: No   PMH of Retinopathy: No   PMH of MTC/MEN Type II: No    Drug-drug interactions:  No significant drug interactions that require medication adjustment at this time.     Assessment/Plan   Problem List Items Addressed This Visit       Morbid obesity with BMI of 45.0-49.9, adult (Multi)    Relevant Medications    tirzepatide, weight loss, (Zepbound) 15 mg/0.5 mL injection    Other Relevant Orders    Referral to Clinical Pharmacy   Weight loss: patient is a good candidate for weight loss given BMI >30 kg/m2. Tolerating current therapy with continued but small weight loss. Will continue current therapy at this time.     RX changes:    Continue: Zepbound to 15 mg  All medications are dosed appropriate for renal and hepatic function   Compliance at present is estimated to be excellent.   Follow up: 2/5/2024 at 9:30  am via phone    Kevin Waldron PharmD, BCACP  [phone]     Continue all meds under the continuation of care with the referring provider and clinical pharmacy team.

## 2024-11-09 ENCOUNTER — PHARMACY VISIT (OUTPATIENT)
Dept: PHARMACY | Facility: CLINIC | Age: 59
End: 2024-11-09
Payer: COMMERCIAL

## 2024-12-23 ENCOUNTER — TELEPHONE (OUTPATIENT)
Dept: PHARMACY | Facility: HOSPITAL | Age: 59
End: 2024-12-23
Payer: COMMERCIAL

## 2024-12-23 PROCEDURE — RXMED WILLOW AMBULATORY MEDICATION CHARGE

## 2024-12-23 NOTE — TELEPHONE ENCOUNTER
Mirtha Faria has been approved for prior authorization for Zepbound. Patient has been contacted regarding the status of their prior authorization.     Key: [id number]  Date of expiration: 12/22/2025    Please contact clinical pharmacy with any further questions. Thank you.    Kevin Waldron, PharmD  Clinical Pharmacist

## 2024-12-24 ENCOUNTER — PHARMACY VISIT (OUTPATIENT)
Dept: PHARMACY | Facility: CLINIC | Age: 59
End: 2024-12-24
Payer: COMMERCIAL

## 2025-02-05 ENCOUNTER — APPOINTMENT (OUTPATIENT)
Dept: PHARMACY | Facility: HOSPITAL | Age: 60
End: 2025-02-05
Payer: COMMERCIAL

## 2025-02-05 DIAGNOSIS — E66.01 MORBID OBESITY WITH BMI OF 45.0-49.9, ADULT (MULTI): ICD-10-CM

## 2025-02-05 NOTE — PROGRESS NOTES
Patient is sent at the request of Yolie Serra MD regarding weight loss.  My final recommendations will be communicated back to the requesting provider by way of shared medical record.     Subjective     Mirtha Faria is a 59 y.o. year old female patient with class III obesity (BMI 46.82 kg/m2) complicated by dyslipidemia and hypertension referred for weight loss management. Patient was previously on Wegovy but changed to Zepbound when she began gaining weight on the highest dose of Wegovy.     Patient has 2 doses of Mounjaro left. Tolerating therapy.     Has now changed insurances, tried to pick a plan that covers GLP-1s but did not see one that covered them.     The patient does have a known family history of diabetes.    Allergies   Allergen Reactions    Hydrochlorothiazide Rash     Diet:   - Previously discussed potential for a dietician visit  - Breakfast: black coffee, Premier protein shake  - Lunch: leftovers (salmon, chicken, meatloaf)  -Dinner: protein, a starch, and a vegetable (meal varies)  -Snacks (evening): Premier protein cereal, protein pancakes    Weight:     Digital home scale used     - 12/21/22 was 342 lb   - 4/12/23 ~337lb last she remembers  - 5/1/23: Has not been weighing but notices that her clothes are bigger, feels the weight loss  - 6/1/23- notices clothes are fitting better, 332 lbs   - 6/28/23- feels like things are fitting better.  She forgot to weight herself today.   - 7/14/23 - 331 lb (Office visit)  - 10/9/2023 - 324 lb (Office visit)  - 10/25/2023 - 311 (Home scale)  - 1/10/2024 - 303 (Home scale)  - 2/7/2024 -303 (Home scale)  - 4/3/2024 304 (Home scale)  - 5/1/2024 307 (Home scale)  - 5/29/2024 307 (Home scale)  -6/19/2024: no updated weight since starting Zepbound per patient  - 7/17/2024: didn't weight herself  -8/14/2024: 307 lbs  -9/11/2024: 305 lbs  -10/9/2024: 304 lbs  -11/6/2024: 302-303 lbs    Exercise:   - Gym with her son   - Dog walking   - Tries to 30-40 minutes  of exercise 7 days per week     Objective     There were no vitals taken for this visit.    Current Pharmacotherapy:    - Zepbound 15 mg weekly    Historical Pharmacotherapy:  - Wegovy 2.4 mg: inject 2.4 mg once weekly on Sundays    Adverse Effects:   - none per patient    Lab Review  Lab Results   Component Value Date    BILITOT 0.4 06/26/2024    CALCIUM 9.4 06/26/2024    CO2 28 06/26/2024     06/26/2024    CREATININE 0.89 06/26/2024    GLUCOSE 94 06/26/2024    ALKPHOS 78 06/26/2024    K 4.1 06/26/2024    PROT 6.9 06/26/2024     06/26/2024    AST 12 06/26/2024    ALT 15 06/26/2024    BUN 21 06/26/2024    ANIONGAP 14 06/26/2024    ALBUMIN 4.3 06/26/2024    GFRF 77 07/10/2023     Lab Results   Component Value Date    TRIG 146 06/26/2024    CHOL 183 06/26/2024    LDLCALC 106 (H) 06/26/2024    HDL 47.6 06/26/2024     Component      Latest Ref Rng 7/10/2023   LDL      0 - 99 mg/dL 78      Pertinent PMH Review:  PMH of Pancreatitis: No   PMH of Retinopathy: No   PMH of MTC/MEN Type II: No    Drug-drug interactions:  No significant drug interactions that require medication adjustment at this time.     Assessment/Plan   Problem List Items Addressed This Visit       Morbid obesity with BMI of 45.0-49.9, adult (Multi)   Weight loss: patient is a good candidate for weight loss given BMI >30 kg/m2. Unfortunately d/t loss of her job and change to insurance, GLP-1s are not longer covered. Declined higher out of pocket options.      RX changes:    Stop: Zepbound to 15 mg  All medications are dosed appropriate for renal and hepatic function   Compliance at present is estimated to be excellent.   Follow up: no follow-up scheduled at this time    Kevin Waldron, PharmD, BCACP  [phone]     Continue all meds under the continuation of care with the referring provider and clinical pharmacy team.

## 2025-04-01 ENCOUNTER — TELEPHONE (OUTPATIENT)
Dept: PRIMARY CARE | Facility: CLINIC | Age: 60
End: 2025-04-01
Payer: COMMERCIAL

## 2025-04-01 DIAGNOSIS — E78.5 HYPERLIPIDEMIA, UNSPECIFIED HYPERLIPIDEMIA TYPE: ICD-10-CM

## 2025-04-01 DIAGNOSIS — I10 BENIGN ESSENTIAL HYPERTENSION: ICD-10-CM

## 2025-04-01 NOTE — TELEPHONE ENCOUNTER
Pt called and scheduled her physical and requested Rx refills   Lisinopril 20 mg   Metoprolol XL 50 mg  Simvastatin 40 mg   Send to [facility]     Last seen 06/28/24  Scheduled 08/04/25  Pending B/w     Pt has about 2 wks left of medication

## 2025-04-02 RX ORDER — METOPROLOL SUCCINATE 50 MG/1
50 TABLET, EXTENDED RELEASE ORAL DAILY
Qty: 30 TABLET | Refills: 0 | Status: SHIPPED | OUTPATIENT
Start: 2025-04-02

## 2025-04-02 RX ORDER — LISINOPRIL 20 MG/1
20 TABLET ORAL DAILY
Qty: 30 TABLET | Refills: 0 | Status: SHIPPED | OUTPATIENT
Start: 2025-04-02

## 2025-04-02 RX ORDER — SIMVASTATIN 40 MG/1
40 TABLET, FILM COATED ORAL DAILY
Qty: 30 TABLET | Refills: 0 | Status: SHIPPED | OUTPATIENT
Start: 2025-04-02

## 2025-04-17 LAB
ALBUMIN SERPL-MCNC: 4.3 G/DL (ref 3.6–5.1)
ALP SERPL-CCNC: 81 U/L (ref 37–153)
ALT SERPL-CCNC: 17 U/L (ref 6–29)
ANION GAP SERPL CALCULATED.4IONS-SCNC: 8 MMOL/L (CALC) (ref 7–17)
AST SERPL-CCNC: 12 U/L (ref 10–35)
BASOPHILS # BLD AUTO: 37 CELLS/UL (ref 0–200)
BASOPHILS NFR BLD AUTO: 0.6 %
BILIRUB SERPL-MCNC: 0.5 MG/DL (ref 0.2–1.2)
BUN SERPL-MCNC: 17 MG/DL (ref 7–25)
CALCIUM SERPL-MCNC: 9.4 MG/DL (ref 8.6–10.4)
CHLORIDE SERPL-SCNC: 105 MMOL/L (ref 98–110)
CHOLEST SERPL-MCNC: 207 MG/DL
CHOLEST/HDLC SERPL: 3.6 (CALC)
CO2 SERPL-SCNC: 27 MMOL/L (ref 20–32)
CREAT SERPL-MCNC: 0.71 MG/DL (ref 0.5–1.03)
EGFRCR SERPLBLD CKD-EPI 2021: 99 ML/MIN/1.73M2
EOSINOPHIL # BLD AUTO: 180 CELLS/UL (ref 15–500)
EOSINOPHIL NFR BLD AUTO: 2.9 %
ERYTHROCYTE [DISTWIDTH] IN BLOOD BY AUTOMATED COUNT: 14.6 % (ref 11–15)
EST. AVERAGE GLUCOSE BLD GHB EST-MCNC: 114 MG/DL
EST. AVERAGE GLUCOSE BLD GHB EST-SCNC: 6.3 MMOL/L
FERRITIN SERPL-MCNC: 19 NG/ML (ref 16–232)
GLUCOSE SERPL-MCNC: 97 MG/DL (ref 65–99)
HBA1C MFR BLD: 5.6 %
HCT VFR BLD AUTO: 42.6 % (ref 35–45)
HDLC SERPL-MCNC: 57 MG/DL
HGB BLD-MCNC: 13.9 G/DL (ref 11.7–15.5)
IRON SATN MFR SERPL: 22 % (CALC) (ref 16–45)
IRON SERPL-MCNC: 77 MCG/DL (ref 45–160)
LDLC SERPL CALC-MCNC: 121 MG/DL (CALC)
LYMPHOCYTES # BLD AUTO: 1600 CELLS/UL (ref 850–3900)
LYMPHOCYTES NFR BLD AUTO: 25.8 %
MCH RBC QN AUTO: 29.4 PG (ref 27–33)
MCHC RBC AUTO-ENTMCNC: 32.6 G/DL (ref 32–36)
MCV RBC AUTO: 90.1 FL (ref 80–100)
MONOCYTES # BLD AUTO: 496 CELLS/UL (ref 200–950)
MONOCYTES NFR BLD AUTO: 8 %
NEUTROPHILS # BLD AUTO: 3887 CELLS/UL (ref 1500–7800)
NEUTROPHILS NFR BLD AUTO: 62.7 %
NONHDLC SERPL-MCNC: 150 MG/DL (CALC)
PLATELET # BLD AUTO: 341 THOUSAND/UL (ref 140–400)
PMV BLD REES-ECKER: 9.6 FL (ref 7.5–12.5)
POTASSIUM SERPL-SCNC: 4.5 MMOL/L (ref 3.5–5.3)
PROT SERPL-MCNC: 6.9 G/DL (ref 6.1–8.1)
RBC # BLD AUTO: 4.73 MILLION/UL (ref 3.8–5.1)
SODIUM SERPL-SCNC: 140 MMOL/L (ref 135–146)
TIBC SERPL-MCNC: 349 MCG/DL (CALC) (ref 250–450)
TRIGL SERPL-MCNC: 169 MG/DL
TSH SERPL-ACNC: 2 MIU/L (ref 0.4–4.5)
WBC # BLD AUTO: 6.2 THOUSAND/UL (ref 3.8–10.8)

## 2025-04-21 ENCOUNTER — APPOINTMENT (OUTPATIENT)
Dept: PRIMARY CARE | Facility: CLINIC | Age: 60
End: 2025-04-21
Payer: COMMERCIAL

## 2025-04-21 VITALS
OXYGEN SATURATION: 98 % | WEIGHT: 293 LBS | BODY MASS INDEX: 41.95 KG/M2 | TEMPERATURE: 97.9 F | HEIGHT: 70 IN | SYSTOLIC BLOOD PRESSURE: 124 MMHG | HEART RATE: 73 BPM | DIASTOLIC BLOOD PRESSURE: 84 MMHG

## 2025-04-21 DIAGNOSIS — I10 BENIGN ESSENTIAL HYPERTENSION: ICD-10-CM

## 2025-04-21 DIAGNOSIS — Z12.31 ENCOUNTER FOR SCREENING MAMMOGRAM FOR MALIGNANT NEOPLASM OF BREAST: Primary | ICD-10-CM

## 2025-04-21 DIAGNOSIS — Z13.1 SCREENING FOR DIABETES MELLITUS: ICD-10-CM

## 2025-04-21 DIAGNOSIS — E78.5 HYPERLIPIDEMIA, UNSPECIFIED HYPERLIPIDEMIA TYPE: ICD-10-CM

## 2025-04-21 PROCEDURE — 3074F SYST BP LT 130 MM HG: CPT | Performed by: INTERNAL MEDICINE

## 2025-04-21 PROCEDURE — 1036F TOBACCO NON-USER: CPT | Performed by: INTERNAL MEDICINE

## 2025-04-21 PROCEDURE — 99214 OFFICE O/P EST MOD 30 MIN: CPT | Performed by: INTERNAL MEDICINE

## 2025-04-21 PROCEDURE — 3079F DIAST BP 80-89 MM HG: CPT | Performed by: INTERNAL MEDICINE

## 2025-04-21 PROCEDURE — 3008F BODY MASS INDEX DOCD: CPT | Performed by: INTERNAL MEDICINE

## 2025-04-21 RX ORDER — METOPROLOL SUCCINATE 50 MG/1
50 TABLET, EXTENDED RELEASE ORAL DAILY
Qty: 90 TABLET | Refills: 1 | Status: SHIPPED | OUTPATIENT
Start: 2025-04-21

## 2025-04-21 RX ORDER — LISINOPRIL 20 MG/1
20 TABLET ORAL DAILY
Qty: 90 TABLET | Refills: 1 | Status: SHIPPED | OUTPATIENT
Start: 2025-04-21

## 2025-04-21 RX ORDER — SIMVASTATIN 40 MG/1
40 TABLET, FILM COATED ORAL DAILY
Qty: 90 TABLET | Refills: 1 | Status: SHIPPED | OUTPATIENT
Start: 2025-04-21

## 2025-04-21 ASSESSMENT — ENCOUNTER SYMPTOMS
LOSS OF SENSATION IN FEET: 0
OCCASIONAL FEELINGS OF UNSTEADINESS: 0
DEPRESSION: 0

## 2025-04-21 NOTE — PROGRESS NOTES
"Subjective   Patient ID: Mirtha Faria is a 59 y.o. female who presents for Follow-up (EP.  Follow up hyperlipidemia and HTN.  Labs done.  No concerns.).  HPI    History of Present Illness  Mirtha Faria is a 59-year-old female with hypertension and hyperlipidemia who presents for a follow-up visit and prescription refills.    She requires refills for her medications, including lisinopril and metoprolol for hypertension, and simvastatin for hyperlipidemia. She previously used Semglee, Zepbound, and Wegovy for weight management but discontinued them due to insurance changes.    She has experienced significant life changes, including leaving her job at [employer] after 32 years, which has been a source of stress. She transitioned to day trading, which she describes as 'life changing' and beneficial for stress management. She is focusing on improving her mindset and diet, choosing healthier options like sweet potatoes instead of chocolate during stressful times.    She lost her insurance and switched to [employer], which does not cover Zepbound. Consequently, she is managing her weight more naturally by walking her dogs and maintaining a schedule.    Recent lab work shows a hemoglobin A1c of 5.6, with previous readings of 5.3 and 5.5. Her cholesterol levels include a total cholesterol of 207, LDL of 121, HDL of 57, and triglycerides of 169.    She occasionally takes iron supplements and reports her thyroid and iron levels are currently fine. She had a hysterectomy in late 2022, which she believes has helped improve her iron levels.    Review of Systems  Review of systems was performed and is otherwise negative except as noted in HPI.      Objective   /84   Pulse 73   Temp 36.6 °C (97.9 °F) (Oral)   Ht 1.778 m (5' 10\")   Wt 150 kg (330 lb)   SpO2 98%   BMI 47.35 kg/m²      Physical Exam  HEENT is normal  Lungs clear bilaterally  Heart is regular rate rhythm no murmurs  Abdomen benign  Lower extremities no " edema     Assessment/Plan   Diagnoses and all orders for this visit:  Benign essential hypertension  -     lisinopril 20 mg tablet; Take 1 tablet (20 mg) by mouth once daily.  -     metoprolol succinate XL (Toprol-XL) 50 mg 24 hr tablet; Take 1 tablet (50 mg) by mouth once daily.  Hyperlipidemia, unspecified hyperlipidemia type  -     simvastatin (Zocor) 40 mg tablet; Take 1 tablet (40 mg) by mouth once daily.    Assessment & Plan  Morbid Obesity  Previously used Semglee, Zepbound, and Wegovy for weight management but discontinued due to insurance changes. Currently focusing on natural methods such as diet and exercise, including walking her dogs and stress management techniques.  - Encourage continued lifestyle modifications including regular physical activity and healthy eating habits.    Hyperlipidemia  Cholesterol levels are slightly elevated with a total cholesterol of 207 mg/dL, LDL of 121 mg/dL, HDL of 57 mg/dL, and triglycerides of 169 mg/dL. The cholesterol ratio is 3.6, slightly above the desired level of 3.5. Current management with simvastatin is continued.  - Continue simvastatin 40 mg oral once daily.  - Encourage regular exercise.  - Advise dietary modifications to reduce carbohydrate intake.    Benign Essential Hypertension  Currently on lisinopril and metoprolol for blood pressure management, indicating well-managed hypertension.  - Continue lisinopril 20 mg oral once daily.  - Continue metoprolol succinate XL 50 mg oral once daily.    Low Iron Stores  Iron levels are currently normal, with sporadic iron supplement use. The recent hysterectomy in 2022 has likely contributed to improved iron levels.  - Order iron studies at the next visit.    General Health Maintenance  Engaging in regular physical activity and dietary changes to manage stress eating, focusing on maintaining a balanced lifestyle to support mental and physical health.  - Encourage continued physical activity and stress management  techniques.    Yolie Serra MD  This medical note was created with the assistance of artificial intelligence (AI) for documentation purposes. The content has been reviewed and confirmed by the healthcare provider for accuracy and completeness. Patient consented to the use of audio recording and use of AI during their visit.

## 2025-08-04 ENCOUNTER — APPOINTMENT (OUTPATIENT)
Dept: PRIMARY CARE | Facility: CLINIC | Age: 60
End: 2025-08-04
Payer: COMMERCIAL

## 2025-10-20 ENCOUNTER — APPOINTMENT (OUTPATIENT)
Dept: PRIMARY CARE | Facility: CLINIC | Age: 60
End: 2025-10-20
Payer: COMMERCIAL